# Patient Record
Sex: FEMALE | Race: BLACK OR AFRICAN AMERICAN | NOT HISPANIC OR LATINO
[De-identification: names, ages, dates, MRNs, and addresses within clinical notes are randomized per-mention and may not be internally consistent; named-entity substitution may affect disease eponyms.]

---

## 2019-04-28 ENCOUNTER — FORM ENCOUNTER (OUTPATIENT)
Age: 59
End: 2019-04-28

## 2019-10-24 PROBLEM — Z00.00 ENCOUNTER FOR PREVENTIVE HEALTH EXAMINATION: Status: ACTIVE | Noted: 2019-10-24

## 2019-10-30 ENCOUNTER — APPOINTMENT (OUTPATIENT)
Dept: ENDOCRINOLOGY | Facility: CLINIC | Age: 59
End: 2019-10-30
Payer: COMMERCIAL

## 2019-10-30 VITALS
BODY MASS INDEX: 30.35 KG/M2 | SYSTOLIC BLOOD PRESSURE: 132 MMHG | HEART RATE: 94 BPM | HEIGHT: 70 IN | DIASTOLIC BLOOD PRESSURE: 75 MMHG | WEIGHT: 212 LBS

## 2019-10-30 DIAGNOSIS — Z83.3 FAMILY HISTORY OF DIABETES MELLITUS: ICD-10-CM

## 2019-10-30 DIAGNOSIS — Z82.49 FAMILY HISTORY OF ISCHEMIC HEART DISEASE AND OTHER DISEASES OF THE CIRCULATORY SYSTEM: ICD-10-CM

## 2019-10-30 PROCEDURE — 99205 OFFICE O/P NEW HI 60 MIN: CPT

## 2019-11-01 LAB
25(OH)D3 SERPL-MCNC: 18.5 NG/ML
ALBUMIN SERPL ELPH-MCNC: 4.5 G/DL
ALP BLD-CCNC: 94 U/L
ALT SERPL-CCNC: 34 U/L
ANION GAP SERPL CALC-SCNC: 11 MMOL/L
AST SERPL-CCNC: 32 U/L
BILIRUB SERPL-MCNC: 0.4 MG/DL
BUN SERPL-MCNC: 11 MG/DL
CALCIUM SERPL-MCNC: 11.5 MG/DL
CALCIUM SERPL-MCNC: 11.5 MG/DL
CHLORIDE SERPL-SCNC: 102 MMOL/L
CO2 SERPL-SCNC: 24 MMOL/L
CREAT SERPL-MCNC: 0.58 MG/DL
GLUCOSE SERPL-MCNC: 142 MG/DL
MAGNESIUM SERPL-MCNC: 1.8 MG/DL
PARATHYROID HORMONE INTACT: 105 PG/ML
PHOSPHATE SERPL-MCNC: 2.8 MG/DL
POTASSIUM SERPL-SCNC: 4.3 MMOL/L
PROT SERPL-MCNC: 7.8 G/DL
SODIUM SERPL-SCNC: 136 MMOL/L

## 2019-11-01 RX ORDER — ERGOCALCIFEROL 1.25 MG/1
1.25 MG CAPSULE ORAL
Refills: 0 | Status: DISCONTINUED | COMMUNITY
End: 2019-11-01

## 2019-11-12 ENCOUNTER — FORM ENCOUNTER (OUTPATIENT)
Age: 59
End: 2019-11-12

## 2019-11-13 ENCOUNTER — APPOINTMENT (OUTPATIENT)
Dept: RADIOLOGY | Facility: CLINIC | Age: 59
End: 2019-11-13
Payer: COMMERCIAL

## 2019-11-13 ENCOUNTER — OUTPATIENT (OUTPATIENT)
Dept: OUTPATIENT SERVICES | Facility: HOSPITAL | Age: 59
LOS: 1 days | End: 2019-11-13

## 2019-11-13 ENCOUNTER — APPOINTMENT (OUTPATIENT)
Dept: ULTRASOUND IMAGING | Facility: CLINIC | Age: 59
End: 2019-11-13
Payer: COMMERCIAL

## 2019-11-13 PROCEDURE — 76770 US EXAM ABDO BACK WALL COMP: CPT | Mod: 26

## 2019-11-13 PROCEDURE — 77085 DXA BONE DENSITY AXL VRT FX: CPT | Mod: 26

## 2019-11-13 PROCEDURE — 77081 DXA BONE DENSITY APPENDICULR: CPT | Mod: 26,59

## 2019-11-14 ENCOUNTER — TRANSCRIPTION ENCOUNTER (OUTPATIENT)
Age: 59
End: 2019-11-14

## 2019-11-14 NOTE — ASSESSMENT
[FreeTextEntry1] : Primary hyperparathyroidism. She was first noted to have mild hypercalcemia in 2016. She has hypercalcemia with elevated PTH concentrations consistent with primary hyperparathyroidism. We discussed the complications of primary hyperparathyroidism, including but not limited to hypercalcemia, nephrolithiasis, and osteoporosis. We discussed renal ultrasound and bone density testing for further evaluation. We discussed the recommendations for calcium and vitamin D intake; there is no indication to restrict calcium intake in patients with primary hyperparathyroidism. We can continue to monitor if she has mild, asymptomatic disease. \par Check calciotropic panel\par Renal ultrasound to evaluate for nephrolithiasis\par Bone density testing with vertebral fracture assessment to evaluate for osteoporosis and morphometric vertebral fracture\par Calcium 1200 mg daily from diet and supplements (to be taken in divided doses as no more than 500-600 mg can be absorbed at one time); advised dietary calcium\par Adjust vitamin D to 2000 intl units daily pending 25-hydroxyvitamin D level \par \par I reviewed the laboratories performed from 2016 to present with the patient today. \par I counseled the patient regarding calcium and vitamin D intake today.\par \par CC:\par Dr. Luisana Feliz, Fax [phone]

## 2019-11-14 NOTE — HISTORY OF PRESENT ILLNESS
[FreeTextEntry1] : Ms. Navarro is a 59 year-old woman with a history of type 2 diabetes mellitus (HbA1c 6.3% in September 2019), hyperlipidemia presenting to establish care with me for primary hyperparathyroidism.\par \par Primary hyperparathyroidism. \par She was first noted to have hypercalcemia in August 2016 with serum calcium 10.7 mg/dL (normal: 8.6-10.4; albumin 4.1 g/dL). Laboratory tests from September 2019 significant for serum calcium 10.9 mg/dL (normal: 8.6-10.4) with PTH 57 pg/mL (normal: 14-64). Renal function within range.\par No history of nephrolithiasis or fracture. No renal imaging or bone density testing.\par She has rare dietary calcium intake. No calcium supplements. She was started on ergocalciferol 50,000 intl units weekly in 2016.\par Mother and son with history of diabetes. No family history of calcium or other endocrine disorders. \par \par She has hot flashes, overall improving from previous. No chest pain, shortness of breath, polyuria/polydipsia, lower extremity numbness/tingling.

## 2019-11-14 NOTE — DATA REVIEWED
[FreeTextEntry1] : Laboratories (September 21, 2019) reviewed and significant for:\par Calcium 10.9 (albumin 4.2 g/dL)\par Alkaline phosphatase 89 U/L (normal: )\par BUN/creatinine 8/0.59 mg/dL (eGFR 117 mL/min)\par Phosphorus 2.7 mg/dL (normal: 2.5-4.5)\par TSH 1.21 uIU/mL (normal: 0.4-4.50)\par HbA1c 6.3%\par \par Otherwise summarized as per HPI.

## 2019-11-14 NOTE — PHYSICAL EXAM
[Alert] : alert [No Acute Distress] : no acute distress [Healthy Appearance] : healthy appearance [Normal Sclera/Conjunctiva] : normal sclera/conjunctiva [Normal Oropharynx] : the oropharynx was normal [No Neck Mass] : no neck mass was observed [Supple] : the neck was supple [No LAD] : no lymphadenopathy [Thyroid Not Enlarged] : the thyroid was not enlarged [No Thyroid Nodules] : there were no palpable thyroid nodules [Normal Rate and Effort] : normal respiratory rhythm and effort [Clear to Auscultation] : lungs were clear to auscultation bilaterally [Normal Rate] : heart rate was normal  [Normal S1, S2] : normal S1 and S2 [Regular Rhythm] : with a regular rhythm [No Stigmata of Cushings Syndrome] : no stigmata of cushings syndrome [Normal Gait] : normal gait [Acanthosis Nigricans] : acanthosis nigricans [Normal Insight/Judgement] : insight and judgment were intact [Kyphosis] : no kyphosis present [de-identified] : no moon facies, no supraclavicular fat pads

## 2019-11-14 NOTE — ADDENDUM
[FreeTextEntry1] : Recent test results as below; discussed with Ms. Navarro. Serum calcium 11.5 mg/dL with  pg/mL, consistent with primary hyperparathyroidism. I encouraged hydration to help lower serum calcium. Vitamin D remains low on ergocalciferol 50,000 intl units weekly and recommend adjusting to cholecalciferol 50,000 intl units weekly. She is scheduled for renal ultrasound and bone density and will call me afterwards to discuss results. Even if imaging is within range, I want to see her back in 3-4 months to trend serum calcium, since she may meet surgical criteria if it remains above 1 mg/dL above the upper normal limit. 11/01/19\par \par Recent ultrasound without evidence of nephrolithiasis. Recent bone density significant for T-scores of -1.9 at the lumbar spine, -1.0 at the femoral neck, -0.8 at the total hip, -1.6 at the 1/3 radius. Vertebral fracture assessment without evidence of compression fractures. Her 10 year fracture risk calculated by F[facility] is 2.8% for major osteoporotic fracture and 0.1% for hip fracture, below the treatment thresholds. I discussed these reassuring results with Ms. Navarro. She will make an appointment to see me in 3 months for monitoring of serum calcium. 11/14/19

## 2020-04-30 ENCOUNTER — FORM ENCOUNTER (OUTPATIENT)
Age: 60
End: 2020-04-30

## 2020-05-07 ENCOUNTER — FORM ENCOUNTER (OUTPATIENT)
Age: 60
End: 2020-05-07

## 2021-05-27 ENCOUNTER — APPOINTMENT (OUTPATIENT)
Dept: ENDOCRINOLOGY | Facility: CLINIC | Age: 61
End: 2021-05-27
Payer: COMMERCIAL

## 2021-05-27 VITALS
WEIGHT: 217 LBS | SYSTOLIC BLOOD PRESSURE: 147 MMHG | BODY MASS INDEX: 31.07 KG/M2 | HEART RATE: 89 BPM | DIASTOLIC BLOOD PRESSURE: 87 MMHG | HEIGHT: 70 IN

## 2021-05-27 LAB
GLUCOSE BLDC GLUCOMTR-MCNC: 221
HBA1C MFR BLD HPLC: 7.2

## 2021-05-27 PROCEDURE — 99214 OFFICE O/P EST MOD 30 MIN: CPT | Mod: 25

## 2021-05-27 PROCEDURE — 83036 HEMOGLOBIN GLYCOSYLATED A1C: CPT | Mod: QW

## 2021-05-27 PROCEDURE — 82962 GLUCOSE BLOOD TEST: CPT

## 2021-05-27 RX ORDER — CALCIUM CITRATE/VITAMIN D3 315MG-6.25
TABLET ORAL
Refills: 0 | Status: ACTIVE | COMMUNITY

## 2021-05-27 RX ORDER — VIT C/E/ZN/COPPR/LUTEIN/ZEAXAN 250MG-90MG
CAPSULE ORAL
Refills: 0 | Status: ACTIVE | COMMUNITY

## 2021-06-07 ENCOUNTER — OUTPATIENT (OUTPATIENT)
Dept: OUTPATIENT SERVICES | Facility: HOSPITAL | Age: 61
LOS: 1 days | End: 2021-06-07

## 2021-06-07 ENCOUNTER — RESULT REVIEW (OUTPATIENT)
Age: 61
End: 2021-06-07

## 2021-06-07 ENCOUNTER — APPOINTMENT (OUTPATIENT)
Dept: RADIOLOGY | Facility: CLINIC | Age: 61
End: 2021-06-07
Payer: COMMERCIAL

## 2021-06-07 PROCEDURE — 77085 DXA BONE DENSITY AXL VRT FX: CPT | Mod: 26

## 2021-11-23 ENCOUNTER — APPOINTMENT (OUTPATIENT)
Dept: ENDOCRINOLOGY | Facility: CLINIC | Age: 61
End: 2021-11-23

## 2021-12-13 ENCOUNTER — APPOINTMENT (OUTPATIENT)
Dept: ENDOCRINOLOGY | Facility: CLINIC | Age: 61
End: 2021-12-13
Payer: COMMERCIAL

## 2021-12-13 VITALS
SYSTOLIC BLOOD PRESSURE: 140 MMHG | DIASTOLIC BLOOD PRESSURE: 85 MMHG | WEIGHT: 214 LBS | HEIGHT: 70 IN | BODY MASS INDEX: 30.64 KG/M2 | HEART RATE: 97 BPM

## 2021-12-13 LAB
GLUCOSE BLDC GLUCOMTR-MCNC: 100
HBA1C MFR BLD HPLC: 7.5

## 2021-12-13 PROCEDURE — 83036 HEMOGLOBIN GLYCOSYLATED A1C: CPT | Mod: QW

## 2021-12-13 PROCEDURE — 82962 GLUCOSE BLOOD TEST: CPT

## 2021-12-13 PROCEDURE — 99213 OFFICE O/P EST LOW 20 MIN: CPT | Mod: 25

## 2021-12-13 NOTE — REASON FOR VISIT
[Follow - Up] : a follow-up visit [Hypercalcemia] : hypercalcemia [DM Type 2] : DM Type 2 [Hypoparathyroidism] : hypoparathyroidism

## 2021-12-16 DIAGNOSIS — R74.8 ABNORMAL LEVELS OF OTHER SERUM ENZYMES: ICD-10-CM

## 2021-12-20 LAB
25(OH)D3 SERPL-MCNC: 16.2 NG/ML
ALBUMIN SERPL ELPH-MCNC: 4.7 G/DL
ALP BLD-CCNC: 129 U/L
ALT SERPL-CCNC: 41 U/L
ANION GAP SERPL CALC-SCNC: 10 MMOL/L
AST SERPL-CCNC: 55 U/L
BASOPHILS # BLD AUTO: 0.02 K/UL
BASOPHILS NFR BLD AUTO: 0.3 %
BILIRUB SERPL-MCNC: 0.5 MG/DL
BUN SERPL-MCNC: 10 MG/DL
CALCIUM SERPL-MCNC: 11.5 MG/DL
CALCIUM SERPL-MCNC: 11.5 MG/DL
CHLORIDE SERPL-SCNC: 103 MMOL/L
CHOLEST SERPL-MCNC: 214 MG/DL
CO2 SERPL-SCNC: 26 MMOL/L
CREAT SERPL-MCNC: 0.83 MG/DL
CREAT SPEC-SCNC: 311 MG/DL
EOSINOPHIL # BLD AUTO: 0.09 K/UL
EOSINOPHIL NFR BLD AUTO: 1.5 %
ESTIMATED AVERAGE GLUCOSE: 166 MG/DL
GGT SERPL-CCNC: 44 U/L
GLUCOSE SERPL-MCNC: 117 MG/DL
HBA1C MFR BLD HPLC: 7.4 %
HCT VFR BLD CALC: 39.2 %
HDLC SERPL-MCNC: 89 MG/DL
HGB BLD-MCNC: 12.5 G/DL
IMM GRANULOCYTES NFR BLD AUTO: 0.2 %
LDLC SERPL CALC-MCNC: 95 MG/DL
LYMPHOCYTES # BLD AUTO: 2.27 K/UL
LYMPHOCYTES NFR BLD AUTO: 38.9 %
MAGNESIUM SERPL-MCNC: 1.8 MG/DL
MAN DIFF?: NORMAL
MCHC RBC-ENTMCNC: 28.8 PG
MCHC RBC-ENTMCNC: 31.9 GM/DL
MCV RBC AUTO: 90.3 FL
MICROALBUMIN 24H UR DL<=1MG/L-MCNC: 7 MG/DL
MICROALBUMIN/CREAT 24H UR-RTO: 22 MG/G
MONOCYTES # BLD AUTO: 0.54 K/UL
MONOCYTES NFR BLD AUTO: 9.2 %
NEUTROPHILS # BLD AUTO: 2.91 K/UL
NEUTROPHILS NFR BLD AUTO: 49.9 %
NONHDLC SERPL-MCNC: 125 MG/DL
PARATHYROID HORMONE INTACT: 146 PG/ML
PHOSPHATE SERPL-MCNC: 3 MG/DL
PLATELET # BLD AUTO: 218 K/UL
POTASSIUM SERPL-SCNC: 4.3 MMOL/L
PROT SERPL-MCNC: 8.3 G/DL
RBC # BLD: 4.34 M/UL
RBC # FLD: 13 %
SODIUM SERPL-SCNC: 138 MMOL/L
T3 SERPL-MCNC: 127 NG/DL
T4 FREE SERPL-MCNC: 1.3 NG/DL
TRIGL SERPL-MCNC: 150 MG/DL
TSH SERPL-ACNC: 1.96 UIU/ML
WBC # FLD AUTO: 5.84 K/UL

## 2021-12-20 NOTE — ASSESSMENT
[Diabetes Foot Care] : diabetes foot care [Carbohydrate Consistent Diet] : carbohydrate consistent diet [Long Term Vascular Complications] : long term vascular complications of diabetes [Importance of Diet and Exercise] : importance of diet and exercise to improve glycemic control, achieve weight loss and improve cardiovascular health [Self Monitoring of Blood Glucose] : self monitoring of blood glucose [FreeTextEntry1] : Primary hyperparathyroidism. She was first noted to have hypercalcemia in August 2016 with serum calcium 10.7 mg/dL (normal: 8.6-10.4; albumin 4.1 g/dL). Laboratory tests from September 2019 significant for serum calcium 10.9 mg/dL (normal: 8.6-10.4) with PTH 57 pg/mL (normal: 14-64). Laboratory evaluation from March 2021 significant for serum calcium 11.0 mg/dL (albumin 4.3 g/dL; normal: 8.6-10.4). 25-hydroxyvitamin D 19 ng/mL.  Evaluation at the time of her initial visit confirmed primary hyperparathyroidism. \par No history of nephrolithiasis. Renal imaging in November 2019 without evidence of nephrolithiasis. Renal function within range.\par No history of fracture. Bone density from November 2019 significant for T-scores of -1.9 at the lumbar spine, -1.0 at the femoral neck, -0.8 at the total hip, -1.6 at the 1/3 radius. Vertebral fracture assessment without evidence of compression fractures. \par  Bone density in June 2021 overall stable at the spine and hip sites from previous. Vertebral fracture assessment without evidence of compression fractures. Not listed on the report is the distal 1/3 radius site, now with a T-score of -2.6 (-9.5%* from previous in 2019). We should consider referral for parathyroid surgery. Dr Chavez and patient never connected to discuss surgery.\par Renal imaging in November 2019 without evidence of nephrolithiasis.\par Calcium 1200 mg daily from diet and supplements (to be taken in divided doses as no more than 500-600 mg can be absorbed at one time); continue current regimen\par Continue ergocalciferol to 50,000 intl units weekly\par Check calciotropic panel next visit\par Interval bone density testing.\par We discussed primary hyperparathyroidism and it's effects on bone density, which is concerning due to decreased bone density in radius on last DEXA. Discussed definitive therapy with parathyroidectomy and RAB.\par Referred to ENT for parathyroidectomy evaluation.\par \par Type 2 diabetes mellitus. Point-of-care HbA1c 7.5%. No known history of micro- or macrovascular complications. We discussed the cardiovascular and microvascular complications of uncontrolled diabetes. We discussed the importance of diet and exercise and lifestyle modification for glycemic control. We will adjust her regimen as below.\par Continue metformin to 1000 mg twice daily. Start Januvia 100mg daily.\par She is not on a blood pressure regimen; blood pressure around goal\par She is on a statin for cholesterol; last lipid panel around goal\par Nephropathy screening: Urine microalbumin within range in Dec 2021\par Last ophthalmology appointment: Oct 2021; every 6 months\par Last podiatry appointment: every three months\par Last dental appointment: December 2020\par She is not up-to-date with pneumonia vaccine; she had had both Covid vaccines, and schedule for booster in Feb.\par \par Return to see Dr Chavez in 6 months.. Patient advised to call earlier with significant hypo- or hyperglycemia.

## 2021-12-20 NOTE — ADDENDUM
[FreeTextEntry1] : 12/20/2021 AL\par Discussed lab results with patient.\par PTH, Ca elevated as expected. Vit D is low. She is taking Vitamin D 50K weekly. Will increase to twice weekly for 8 weeks. She has appt. with ENT.\par AST, ALP, and GGT slightly elevated. Denies abd pain/N/V. Will continue to monitor. Consider abd US if repeat labs remain elevated.

## 2021-12-20 NOTE — HISTORY OF PRESENT ILLNESS
[FreeTextEntry1] : Ms. Navarro is a 61 year-old woman with a history of type 2 diabetes mellitus, hyperlipidemia, primary hyperparathyroidism presenting for follow-up of her endocrine issues. I saw her for an initial visit in October 2019.\par \par Primary hyperparathyroidism. \par She was first noted to have hypercalcemia in August 2016 with serum calcium 10.7 mg/dL (normal: 8.6-10.4; albumin 4.1 g/dL). Laboratory tests from September 2019 significant for serum calcium 10.9 mg/dL (normal: 8.6-10.4) with PTH 57 pg/mL (normal: 14-64). Laboratory evaluation from March 2021 significant for serum calcium 11.0 mg/dL (albumin 4.3 g/dL; normal: 8.6-10.4). 25-hydroxyvitamin D 19 ng/mL.  Evaluation at the time of her initial visit confirmed primary hyperparathyroidism. \par No history of nephrolithiasis. Renal imaging in November 2019 without evidence of nephrolithiasis. Renal function within range.\par No history of fracture. Bone density from November 2019 significant for T-scores of -1.9 at the lumbar spine, -1.0 at the femoral neck, -0.8 at the total hip, -1.6 at the 1/3 radius. Vertebral fracture assessment without evidence of compression fractures. \par  Bone density in June 2021 overall stable at the spine and hip sites from previous. Vertebral fracture assessment without evidence of compression fractures. Not listed on the report is the distal 1/3 radius site, now with a T-score of -2.6 (-9.5%* from previous in 2019). We should consider referral for parathyroid surgery. Dr Chavez and patient never connected to discuss surgery.\par Renal imaging in November 2019 without evidence of nephrolithiasis.\par She has 2 servings of dietary calcium intake daily. She has been taking ergocalciferol 50,000 intl units every weekly.\par Mother and son with history of diabetes. No family history of calcium or other endocrine disorders. \par \par Type 2 diabetes mellitus. Point-of-care HbA1c 7.5% and blood glucose 100 mg/dL today. No known history of micro- or macrovascular complications.\par She was diagnosed with diabetes in 2017. No hospitalizations for hypo- or hyperglycemia.\par She is currently taking metformin 1000 mg twice daily; increased from 500mg twice daily in May 2021.\par She is using freestyle ilya. Brought today for placement. She has a family member who usually place for her. \par Data uploaded and analyzed. 30 day - BG range  = 61%, 141-240 = 38%, >241 = 1%. Fasting at goal, hyperglycemia usually postprandial.\par She is not on a blood pressure regimen\par She is on a statin for cholesterol\par Nephropathy screening: Urine microalbumin within range in March 2021\par Last ophthalmology appointment: March 2021\par Last podiatry appointment: March 2021; every three months\par Last dental appointment: December 2020\par She is not up-to-date with pneumonia vaccine; she has both COVID vaccines and scheduled for booster in Feb.\par \par Interim History \par She is retired and travelling a lot. No new complaints. Diet seems appropriate. Snacks on fruit including grapes.\par No chest pain, shortness of breath, polyuria/polydipsia, lower extremity numbness/tingling. \par Medical and surgical history, medications, allergies, social and family history reviewed and updated as needed. \par  \par

## 2021-12-20 NOTE — PHYSICAL EXAM
[Alert] : alert [Well Nourished] : well nourished [No Acute Distress] : no acute distress [Well Developed] : well developed [Normal Sclera/Conjunctiva] : normal sclera/conjunctiva [EOMI] : extra ocular movement intact [No Proptosis] : no proptosis [Normal Oropharynx] : the oropharynx was normal [No Thyroid Nodules] : no palpable thyroid nodules [No Respiratory Distress] : no respiratory distress [No Accessory Muscle Use] : no accessory muscle use [Clear to Auscultation] : lungs were clear to auscultation bilaterally [Normal S1, S2] : normal S1 and S2 [Normal Rate] : heart rate was normal [Regular Rhythm] : with a regular rhythm [No Edema] : no peripheral edema [Pedal Pulses Normal] : the pedal pulses are present [Normal Bowel Sounds] : normal bowel sounds [Not Tender] : non-tender [Not Distended] : not distended [Soft] : abdomen soft [Normal Anterior Cervical Nodes] : no anterior cervical lymphadenopathy [Normal Posterior Cervical Nodes] : no posterior cervical lymphadenopathy [No Spinal Tenderness] : no spinal tenderness [Spine Straight] : spine straight [No Stigmata of Cushings Syndrome] : no stigmata of Cushings Syndrome [Normal Gait] : normal gait [No Involuntary Movements] : no involuntary movements were seen [Normal Strength/Tone] : muscle strength and tone were normal [No Rash] : no rash [No Tremors] : no tremors [Oriented x3] : oriented to person, place, and time [Normal Insight/Judgement] : insight and judgment were intact [Normal Mood] : the mood was normal [Acanthosis Nigricans] : no acanthosis nigricans [de-identified] : mild thyromegaly

## 2022-01-11 ENCOUNTER — APPOINTMENT (OUTPATIENT)
Dept: OTOLARYNGOLOGY | Facility: CLINIC | Age: 62
End: 2022-01-11
Payer: COMMERCIAL

## 2022-01-11 VITALS
HEIGHT: 70 IN | SYSTOLIC BLOOD PRESSURE: 144 MMHG | BODY MASS INDEX: 30.64 KG/M2 | RESPIRATION RATE: 17 BRPM | HEART RATE: 93 BPM | OXYGEN SATURATION: 98 % | DIASTOLIC BLOOD PRESSURE: 87 MMHG | TEMPERATURE: 98 F | WEIGHT: 214 LBS

## 2022-01-11 DIAGNOSIS — Z86.39 PERSONAL HISTORY OF OTHER ENDOCRINE, NUTRITIONAL AND METABOLIC DISEASE: ICD-10-CM

## 2022-01-11 DIAGNOSIS — Z87.09 PERSONAL HISTORY OF OTHER DISEASES OF THE RESPIRATORY SYSTEM: ICD-10-CM

## 2022-01-11 DIAGNOSIS — Z82.3 FAMILY HISTORY OF STROKE: ICD-10-CM

## 2022-01-11 DIAGNOSIS — Z80.9 FAMILY HISTORY OF MALIGNANT NEOPLASM, UNSPECIFIED: ICD-10-CM

## 2022-01-11 DIAGNOSIS — Z78.9 OTHER SPECIFIED HEALTH STATUS: ICD-10-CM

## 2022-01-11 DIAGNOSIS — Z82.49 FAMILY HISTORY OF ISCHEMIC HEART DISEASE AND OTHER DISEASES OF THE CIRCULATORY SYSTEM: ICD-10-CM

## 2022-01-11 PROCEDURE — 99205 OFFICE O/P NEW HI 60 MIN: CPT | Mod: 25

## 2022-01-11 PROCEDURE — 76536 US EXAM OF HEAD AND NECK: CPT

## 2022-01-11 PROCEDURE — 31575 DIAGNOSTIC LARYNGOSCOPY: CPT

## 2022-01-11 NOTE — HISTORY OF PRESENT ILLNESS
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  She has osteopenia in the hip and spine. She has no renal stones on ultrasound and renal function is normal. Her last Ca/PTH 11.5 mg/dl/ 146 pg/ml are consistent with PHPT and she meets current NIH consensus criteria for definitive treatment. Ladi denies recent shortness of breath, voice changes, dysphagia, anterior neck pain, neck pressure or mass. There is no family history of thyroid cancer. She denies any known radiation exposures in her youth.  She denies calcium, vitamin D supplements, HCTZ or past use of Lithium Carbonate. A first cousin had renal stones but other history of renal disease not known.  There is no history of fragility bone fractures. Other than muscle weakness, joint pain, polyuria/ polydipsia, she denies depression, memory loss, brain fog, nausea, vomiting, abdominal pain, constipation, nephrolithiasis, peptic ulcer disease, pancreatitis or GERD. She denies fever, body aches, cough, cyanosis, chest burning, anosmia or recent known COVID exposures.  All family members at home are well. She has been vaccinated.  She has not had parathyroid neck imaging to date.

## 2022-01-11 NOTE — PROCEDURE
[FreeTextEntry3] : \par [facility]\par \par THYROID/NECK ULTRASOUND REPORT\par \par NAME: FELISA KWONG .Jeannie...           MR# [id number]...... : 1960..   DATE: 2022.\par \par HISTORY/ INDICATIONS: A 61-year-old female with biochemical evidence for primary hyperparathyroidism, osteoporosis, hypercalcemia to rule out a parathyroid adenoma and/or thyroid nodular disease.  \par \par COMPARISON: None.\par \par PROCEDURE: Physician performed high-resolution ultrasound gray scale imaging and color Doppler supplementation of the thyroid gland and neck was obtained in the longitudinal and transverse planes using a 13 MHz linear transducer with image capture.  All measurements are in centimeters (longitudinal x AP x transverse).  \par \par FINDINGS: Overall the thyroid gland is minimally enlarged, right lobe, heterogeneous in echotexture with normal vascularity on color Doppler flow. \par \par RIGHT LOBE: Is minimally enlarged, heterogeneous, with normal vascularity on color Doppler and measures 5.07 x 0.99 x 1.53 cm.  NODULES: There is a possible right lower pole hypoechoic smoothly marginated nodule measuring 0.62 x 0.26 x 0.55 cm representing either a colloid cyst or a normal parathyroid gland.\par \par ISTHMUS: Measures 0.29 cm in AP dimension and is heterogeneous in echotexture with normal vascularity.  No nodules are identified.\par \par LEFT LOBE: Is not enlarged, heterogeneous, with normal vascularity on color Doppler and measures 3.87 x 1.14 x 1.31 cm.  NODULES: None identified.\par \par PARATHYROID GLANDS: Posterior to the left mid thyroid lobe is a mildly hypoechoic, smoothly marginated structure  by an echogenic line and a superior polar feeding vessel that measures 0.81 x 0.34 x 0.76 cm and a possible candidate for a parathyroid adenoma.  There are no other identified enlarged parathyroid glands in the central neck compartment. \par \par LYMPH NODES: Bilateral neck levels I - VI were examined.  There are several benign appearing subcentimeter lymph nodes identified at neck levels II- III bilaterally (lateral neck), all with echogenic hilar lines, no calcifications or cystic degeneration and have a short long axis ratio < 0.5 in the transverse plane.  There are prominent bilateral level II lymph nodes that have a central echogenic hilar line and normal shape.  There are no enlarged or abnormal appearing central compartment, level VI lymph nodes.\par \par IMPRESSION: A 61-year-old female with a borderline enlarged thyroid gland without significant nodules and a good candidate for a left low-lying superior parathyroid adenoma in the mid position posterior to the left thyroid lobe.  A normal parathyroid gland versus a subcentimeter colloid cyst is identified in the area of the right lower thyroid pole.\par \par RECOMMENDATIONS: A 4–D CT scan should be obtained for confirmation of these findings prior to parathyroidectomy.\par \par Electronically signed by referring, interpreting and reporting physician Jose Seaman MD on 2022, 3:45 PM.\par \par [facility]: [address], [address] [place] [address]\par [phone] (voice), [phone] (fax) [de-identified] : The nasal septum is minimally deviated to the right. There are no masses or polyps and the nasal mucosa and secretions are normal. The choanae and posterior nasopharynx are normal without masses or drainage. The Eustachian tube orifices appear patent. The pharynx, including the posterior and lateral pharyngeal walls, the vallecula and base of tongue are normal without ulcerations, lesions or masses. The hypopharynx including the pyriform sinuses open well without pooling of secretions, mucosal lesions or masses. The supraglottic larynx including the epiglottis, petiole, arytenoids, glossoepiglottic, aryepiglottic and pharyngoepiglottic folds are normal without mucosal lesions, ulcerations or masses. The glottis reveals normal false vocal folds. The true vocal folds are glistening white, tense and of equal length, without paralysis, having symmetric mobility on adduction and abduction. There are no mucosal lesions, nodules, cysts, erythroplasia or leukoplakia. The posterior cricoid area has healthy pink mucosa in the interarytenoid area and esophageal inlet. There is moderate thickening/edema of the interarytenoid mucosa suggestive of posterior laryngitis from laryngopharyngeal acid reflux disease. The trachea is clear without narrowing in the immediate subglottic region, without deviation or lesions.  [de-identified] : preoperative evaluation prior to possible parathyroidectomy.

## 2022-01-11 NOTE — REASON FOR VISIT
[FreeTextEntry2] : a surgical consultation concerning primary hyperparathyroidism, osteoporosis and hypercalcemia.  [FreeTextEntry1] : Endocrinologists Ashlyn Chavez MD and Lisa Nelson MD

## 2022-01-11 NOTE — CONSULT LETTER
[FreeTextEntry3] : \par Jose Seaman M.D., FACS, [facility]\par Director [facility]\par [facility]\par Certified in Thyroid/Parathyroid/Neck Ultrasound, [facility]/ [facility]\par \par , Department of Otolaryngology\par [facility]\par

## 2022-01-12 NOTE — PHYSICAL EXAM
[Alert] : alert [Healthy Appearance] : healthy appearance [No Acute Distress] : no acute distress [Normal Sclera/Conjunctiva] : normal sclera/conjunctiva [Normal Hearing] : hearing was normal [No Neck Mass] : no neck mass was observed [No LAD] : no lymphadenopathy [Supple] : the neck was supple [Thyroid Not Enlarged] : the thyroid was not enlarged [No Respiratory Distress] : no respiratory distress [No Stigmata of Cushings Syndrome] : no stigmata of Cushings Syndrome [Normal Gait] : normal gait [Normal Insight/Judgement] : insight and judgment were intact [Kyphosis] : no kyphosis present [Acanthosis Nigricans] : no acanthosis nigricans [de-identified] : no moon facies, no supraclavicular fat pads

## 2022-01-12 NOTE — HISTORY OF PRESENT ILLNESS
[FreeTextEntry1] : Ms. Navarro is a 60 year-old woman with a history of type 2 diabetes mellitus, hyperlipidemia, primary hyperparathyroidism presenting for follow-up of her endocrine issues. I saw her for an initial visit in October 2019.\par \par Primary hyperparathyroidism. \par She was first noted to have hypercalcemia in August 2016 with serum calcium 10.7 mg/dL (normal: 8.6-10.4; albumin 4.1 g/dL). Laboratory tests from September 2019 significant for serum calcium 10.9 mg/dL (normal: 8.6-10.4) with PTH 57 pg/mL (normal: 14-64). Renal function within range. Evaluation at the time of her initial visit confirmed primary hyperparathyroidism. \par No history of nephrolithiasis. Renal imaging in November 2019 without evidence of nephrolithiasis.\par No history of fracture. Bone density from November 2019 significant for T-scores of -1.9 at the lumbar spine, -1.0 at the femoral neck, -0.8 at the total hip, -1.6 at the 1/3 radius. Vertebral fracture assessment without evidence of compression fractures. \par She has rare dietary calcium intake. She is taking Caltrate 600 mg daily. She has been taking ergocalciferol 50,000 intl units every two weeks. \par Mother and son with history of diabetes. No family history of calcium or other endocrine disorders. \par \par Type 2 diabetes mellitus. Point-of-care HbA1c 7.2% and blood glucose 211 mg/dL today. No known history of micro- or macrovascular complications.\par She was diagnosed with diabetes in 2017. No hospitalizations for hypo- or hyperglycemia.\par She is currently taking metformin 500 mg twice daily\par She is not on a blood pressure regimen\par She is on a statin for cholesterol\par Nephropathy screening: Urine microalbumin within range in March 2021\par Last ophthalmology appointment: March 2021\par Last podiatry appointment: March 2021; every three months\par Last dental appointment: December 2020\par She is not up-to-date with pneumonia vaccine; she is in the process of scheduling the COVID-19 vaccine\par \par Interim History \par Renal imaging in November 2019 without evidence of nephrolithiasis. Bone density from November 2019 significant for T-scores of -1.9 at the lumbar spine, -1.0 at the femoral neck, -0.8 at the total hip, -1.6 at the 1/3 radius. Vertebral fracture assessment without evidence of compression fractures. \par Laboratory evaluation from March 2021 significant for serum calcium 11.0 mg/dL (albumin 4.3 g/dL; normal: 8.6-10.4). 25-hydroxyvitamin D 19 ng/mL. \par Her mother passed away from COVID-19 infection last year after a prolonged hospital course.\par No chest pain, shortness of breath, polyuria/polydipsia, lower extremity numbness/tingling. \par Medical and surgical history, medications, allergies, social and family history reviewed and updated as needed.

## 2022-01-12 NOTE — DATA REVIEWED
[FreeTextEntry1] : Laboratories (March 23, 2021) reviewed and significant for:\par Unremarkable complete blood count\par Calcium 11.0 (albumin 4.3 g/dL; normal: 8.6-10.4)\par BUN/creatinine 8/0.59 mg/dL (eGFR 116 mL/min)\par Alkaline phosphatase 95 U/L (normal: )\par TSH 1.04 uIU/mL\par LDL 86 mg/dL\par HDL 79 mg/dL\par Total cholesterol 181 mg/dL\par Triglycerides 72 mg/dL\par Urine microalbumin 17 mcg/mg creatinine\par \par Otherwise summarized as per HPI.

## 2022-01-12 NOTE — ASSESSMENT
[FreeTextEntry1] : Primary hyperparathyroidism. She was first noted to have mild hypercalcemia in 2016. She has hypercalcemia with elevated PTH concentrations consistent with primary hyperparathyroidism. We discussed the complications of primary hyperparathyroidism, including but not limited to hypercalcemia, nephrolithiasis, and osteoporosis. She currently has mild, asymptomatic disease without indication for parathyroid surgery at this time. We discussed the recommendations for calcium and vitamin D intake; there is no indication to restrict calcium intake in patients with primary hyperparathyroidism. \par Check calciotropic panel next visit\par Interval bone density testing\par Calcium 1200 mg daily from diet and supplements (to be taken in divided doses as no more than 500-600 mg can be absorbed at one time); continue current regimen\par Adjust ergocalciferol to 50,000 intl units weekly\par \par Type 2 diabetes mellitus. Point-of-care HbA1c 7.2% and blood glucose 211 mg/dL today. No known history of micro- or macrovascular complications. We discussed the cardiovascular and microvascular complications of uncontrolled diabetes. We discussed the importance of diet and exercise and lifestyle modification for glycemic control. We will adjust her regimen as below.\par Adjust metformin to 1000 mg twice daily\par She is not on a blood pressure regimen; blood pressure around goal\par She is on a statin for cholesterol; last lipid panel around goal\par Nephropathy screening: Urine microalbumin within range in March 2021\par Last ophthalmology appointment: March 2021\par Last podiatry appointment: March 2021; every three months\par Last dental appointment: December 2020\par She is not up-to-date with pneumonia vaccine; she is in the process of scheduling the COVID-19 vaccine\par \par Return to see me in 3 months. Patient advised to call earlier with significant hypo- or hyperglycemia. \par \par I reviewed the bone density testing performed on November 13, 2019 with the patient today.\par I reviewed the renal ultrasound performed on November 13, 2019 with the patient today.\par I reviewed the laboratories performed from 2016 to present with the patient today. \par I counseled the patient regarding calcium and vitamin D intake today.\par \par CC:\par Dr. Luisana Feliz, Fax [phone]

## 2022-02-14 ENCOUNTER — APPOINTMENT (OUTPATIENT)
Dept: OTOLARYNGOLOGY | Facility: CLINIC | Age: 62
End: 2022-02-14
Payer: COMMERCIAL

## 2022-02-14 DIAGNOSIS — Z01.818 ENCOUNTER FOR OTHER PREPROCEDURAL EXAMINATION: ICD-10-CM

## 2022-02-14 PROCEDURE — ZZZZZ: CPT

## 2022-02-22 ENCOUNTER — TRANSCRIPTION ENCOUNTER (OUTPATIENT)
Age: 62
End: 2022-02-22

## 2022-02-22 VITALS
HEIGHT: 70 IN | HEART RATE: 83 BPM | SYSTOLIC BLOOD PRESSURE: 133 MMHG | OXYGEN SATURATION: 98 % | RESPIRATION RATE: 16 BRPM | WEIGHT: 217.6 LBS | TEMPERATURE: 98 F | DIASTOLIC BLOOD PRESSURE: 79 MMHG

## 2022-02-22 NOTE — ASU PATIENT PROFILE, ADULT - REASON FOR ADMISSION, PROFILE
parathyroidectomy, thyroid lobectomy, parathyroid autotransplant, thymectomy, needle electromyography-larynx

## 2022-02-22 NOTE — ASU PATIENT PROFILE, ADULT - FALL HARM RISK - UNIVERSAL INTERVENTIONS
Bed in lowest position, wheels locked, appropriate side rails in place/Call bell, personal items and telephone in reach/Instruct patient to call for assistance before getting out of bed or chair/Non-slip footwear when patient is out of bed/[place] to call system/Physically safe environment - no spills, clutter or unnecessary equipment/Purposeful Proactive Rounding/Room/bathroom lighting operational, light cord in reach

## 2022-02-22 NOTE — ASU PATIENT PROFILE, ADULT - NSICDXPASTMEDICALHX_GEN_ALL_CORE_FT
PAST MEDICAL HISTORY:  DMII (diabetes mellitus, type 2)     Glaucoma     Hammer toes, bilateral     History of onychomycosis     HLD (hyperlipidemia)     PAD (peripheral artery disease)     Vitamin D deficiency      PAST MEDICAL HISTORY:  DMII (diabetes mellitus, type 2)     Glaucoma     Hammer toes, bilateral     History of onychomycosis     HLD (hyperlipidemia)     PAD (peripheral artery disease)     Seasonal allergies     Vitamin D deficiency

## 2022-02-22 NOTE — ASU PATIENT PROFILE, ADULT - NSICDXPASTSURGICALHX_GEN_ALL_CORE_FT
PAST SURGICAL HISTORY:  H/O breast biopsy right    H/O colonoscopy      PAST SURGICAL HISTORY:  H/O breast biopsy right    H/O  section x1    H/O colonoscopy     History of hernia surgery avi

## 2022-02-23 ENCOUNTER — APPOINTMENT (OUTPATIENT)
Dept: OTOLARYNGOLOGY | Facility: HOSPITAL | Age: 62
End: 2022-02-23

## 2022-02-23 ENCOUNTER — OUTPATIENT (OUTPATIENT)
Dept: OUTPATIENT SERVICES | Facility: HOSPITAL | Age: 62
LOS: 1 days | Discharge: HOME | End: 2022-02-23
Payer: COMMERCIAL

## 2022-02-23 ENCOUNTER — RESULT REVIEW (OUTPATIENT)
Age: 62
End: 2022-02-23

## 2022-02-23 VITALS
RESPIRATION RATE: 24 BRPM | OXYGEN SATURATION: 100 % | SYSTOLIC BLOOD PRESSURE: 140 MMHG | DIASTOLIC BLOOD PRESSURE: 80 MMHG | HEART RATE: 80 BPM

## 2022-02-23 DIAGNOSIS — Z98.890 OTHER SPECIFIED POSTPROCEDURAL STATES: Chronic | ICD-10-CM

## 2022-02-23 DIAGNOSIS — Z98.891 HISTORY OF UTERINE SCAR FROM PREVIOUS SURGERY: Chronic | ICD-10-CM

## 2022-02-23 LAB
ALBUMIN SERPL ELPH-MCNC: 4.3 G/DL — SIGNIFICANT CHANGE UP (ref 3.3–5)
ALP SERPL-CCNC: 91 U/L — SIGNIFICANT CHANGE UP (ref 40–120)
ALT FLD-CCNC: 30 U/L — SIGNIFICANT CHANGE UP (ref 10–45)
ANION GAP SERPL CALC-SCNC: 12 MMOL/L — SIGNIFICANT CHANGE UP (ref 5–17)
AST SERPL-CCNC: 32 U/L — SIGNIFICANT CHANGE UP (ref 10–40)
BILIRUB SERPL-MCNC: 0.3 MG/DL — SIGNIFICANT CHANGE UP (ref 0.2–1.2)
BUN SERPL-MCNC: 8 MG/DL — SIGNIFICANT CHANGE UP (ref 7–23)
CALCIUM SERPL-MCNC: 10.8 MG/DL — HIGH (ref 8.4–10.5)
CHLORIDE SERPL-SCNC: 107 MMOL/L — SIGNIFICANT CHANGE UP (ref 96–108)
CO2 SERPL-SCNC: 21 MMOL/L — LOW (ref 22–31)
CREAT SERPL-MCNC: 0.63 MG/DL — SIGNIFICANT CHANGE UP (ref 0.5–1.3)
GLUCOSE BLDC GLUCOMTR-MCNC: 195 MG/DL — HIGH (ref 70–99)
GLUCOSE BLDC GLUCOMTR-MCNC: 198 MG/DL — HIGH (ref 70–99)
GLUCOSE SERPL-MCNC: 166 MG/DL — HIGH (ref 70–99)
MAGNESIUM SERPL-MCNC: 1.7 MG/DL — SIGNIFICANT CHANGE UP (ref 1.6–2.6)
PHOSPHATE SERPL-MCNC: 2.8 MG/DL — SIGNIFICANT CHANGE UP (ref 2.5–4.5)
POTASSIUM SERPL-MCNC: 4 MMOL/L — SIGNIFICANT CHANGE UP (ref 3.5–5.3)
POTASSIUM SERPL-SCNC: 4 MMOL/L — SIGNIFICANT CHANGE UP (ref 3.5–5.3)
PROT SERPL-MCNC: 8.1 G/DL — SIGNIFICANT CHANGE UP (ref 6–8.3)
PTH-INTACT IO % DIF SERPL: 11.8 PG/ML — LOW (ref 15–65)
PTH-INTACT IO % DIF SERPL: 138 PG/ML — HIGH (ref 15–65)
PTH-INTACT IO % DIF SERPL: 15 PG/ML — SIGNIFICANT CHANGE UP (ref 15–65)
PTH-INTACT IO % DIF SERPL: 24.8 PG/ML — SIGNIFICANT CHANGE UP (ref 15–65)
SARS-COV-2 N GENE NPH QL NAA+PROBE: NOT DETECTED
SODIUM SERPL-SCNC: 140 MMOL/L — SIGNIFICANT CHANGE UP (ref 135–145)

## 2022-02-23 PROCEDURE — 84100 ASSAY OF PHOSPHORUS: CPT

## 2022-02-23 PROCEDURE — 88305 TISSUE EXAM BY PATHOLOGIST: CPT | Mod: 26

## 2022-02-23 PROCEDURE — 80053 COMPREHEN METABOLIC PANEL: CPT

## 2022-02-23 PROCEDURE — 88305 TISSUE EXAM BY PATHOLOGIST: CPT

## 2022-02-23 PROCEDURE — 82962 GLUCOSE BLOOD TEST: CPT

## 2022-02-23 PROCEDURE — 88331 PATH CONSLTJ SURG 1 BLK 1SPC: CPT | Mod: 26

## 2022-02-23 PROCEDURE — 88331 PATH CONSLTJ SURG 1 BLK 1SPC: CPT

## 2022-02-23 PROCEDURE — 38510 BIOPSY/REMOVAL LYMPH NODES: CPT | Mod: RT

## 2022-02-23 PROCEDURE — 83970 ASSAY OF PARATHORMONE: CPT

## 2022-02-23 PROCEDURE — C1889: CPT

## 2022-02-23 PROCEDURE — 60500 EXPLORE PARATHYROID GLANDS: CPT

## 2022-02-23 PROCEDURE — 83735 ASSAY OF MAGNESIUM: CPT

## 2022-02-23 PROCEDURE — 38500 BIOPSY/REMOVAL LYMPH NODES: CPT

## 2022-02-23 PROCEDURE — 36415 COLL VENOUS BLD VENIPUNCTURE: CPT

## 2022-02-23 DEVICE — SURGIFLO HEMOSTATIC MATRIX KIT: Type: IMPLANTABLE DEVICE | Status: FUNCTIONAL

## 2022-02-23 RX ORDER — DEXTROSE 50 % IN WATER 50 %
25 SYRINGE (ML) INTRAVENOUS ONCE
Refills: 0 | Status: DISCONTINUED | OUTPATIENT
Start: 2022-02-23 | End: 2022-02-23

## 2022-02-23 RX ORDER — MAGNESIUM SULFATE 500 MG/ML
1 VIAL (ML) INJECTION ONCE
Refills: 0 | Status: COMPLETED | OUTPATIENT
Start: 2022-02-23 | End: 2022-02-23

## 2022-02-23 RX ORDER — DEXTROSE 50 % IN WATER 50 %
15 SYRINGE (ML) INTRAVENOUS ONCE
Refills: 0 | Status: DISCONTINUED | OUTPATIENT
Start: 2022-02-23 | End: 2022-02-23

## 2022-02-23 RX ORDER — ERGOCALCIFEROL 1.25 MG/1
1 CAPSULE ORAL
Qty: 0 | Refills: 0 | DISCHARGE

## 2022-02-23 RX ORDER — ATORVASTATIN CALCIUM 80 MG/1
1 TABLET, FILM COATED ORAL
Qty: 0 | Refills: 0 | DISCHARGE

## 2022-02-23 RX ORDER — ONDANSETRON 8 MG/1
4 TABLET, FILM COATED ORAL EVERY 6 HOURS
Refills: 0 | Status: DISCONTINUED | OUTPATIENT
Start: 2022-02-23 | End: 2022-02-23

## 2022-02-23 RX ORDER — SODIUM,POTASSIUM PHOSPHATES 278-250MG
1 POWDER IN PACKET (EA) ORAL ONCE
Refills: 0 | Status: COMPLETED | OUTPATIENT
Start: 2022-02-23 | End: 2022-02-23

## 2022-02-23 RX ORDER — SITAGLIPTIN 50 MG/1
1 TABLET, FILM COATED ORAL
Qty: 0 | Refills: 0 | DISCHARGE

## 2022-02-23 RX ORDER — OXYCODONE HYDROCHLORIDE 5 MG/1
5 TABLET ORAL EVERY 6 HOURS
Refills: 0 | Status: DISCONTINUED | OUTPATIENT
Start: 2022-02-23 | End: 2022-02-23

## 2022-02-23 RX ORDER — DEXTROSE 50 % IN WATER 50 %
12.5 SYRINGE (ML) INTRAVENOUS ONCE
Refills: 0 | Status: DISCONTINUED | OUTPATIENT
Start: 2022-02-23 | End: 2022-02-23

## 2022-02-23 RX ORDER — SODIUM CHLORIDE 9 MG/ML
1000 INJECTION, SOLUTION INTRAVENOUS
Refills: 0 | Status: DISCONTINUED | OUTPATIENT
Start: 2022-02-23 | End: 2022-02-23

## 2022-02-23 RX ORDER — INSULIN LISPRO 100/ML
VIAL (ML) SUBCUTANEOUS
Refills: 0 | Status: DISCONTINUED | OUTPATIENT
Start: 2022-02-23 | End: 2022-02-23

## 2022-02-23 RX ORDER — ACETAMINOPHEN 500 MG
650 TABLET ORAL EVERY 6 HOURS
Refills: 0 | Status: DISCONTINUED | OUTPATIENT
Start: 2022-02-23 | End: 2022-02-23

## 2022-02-23 RX ORDER — HYDROMORPHONE HYDROCHLORIDE 2 MG/ML
0.5 INJECTION INTRAMUSCULAR; INTRAVENOUS; SUBCUTANEOUS ONCE
Refills: 0 | Status: DISCONTINUED | OUTPATIENT
Start: 2022-02-23 | End: 2022-02-23

## 2022-02-23 RX ORDER — GLUCAGON INJECTION, SOLUTION 0.5 MG/.1ML
1 INJECTION, SOLUTION SUBCUTANEOUS ONCE
Refills: 0 | Status: DISCONTINUED | OUTPATIENT
Start: 2022-02-23 | End: 2022-02-23

## 2022-02-23 RX ORDER — METFORMIN HYDROCHLORIDE 850 MG/1
1 TABLET ORAL
Qty: 0 | Refills: 0 | DISCHARGE

## 2022-02-23 RX ADMIN — Medication 1: at 16:25

## 2022-02-23 RX ADMIN — OXYCODONE HYDROCHLORIDE 5 MILLIGRAM(S): 5 TABLET ORAL at 12:20

## 2022-02-23 RX ADMIN — HYDROMORPHONE HYDROCHLORIDE 0.5 MILLIGRAM(S): 2 INJECTION INTRAMUSCULAR; INTRAVENOUS; SUBCUTANEOUS at 12:48

## 2022-02-23 RX ADMIN — HYDROMORPHONE HYDROCHLORIDE 0.5 MILLIGRAM(S): 2 INJECTION INTRAMUSCULAR; INTRAVENOUS; SUBCUTANEOUS at 12:53

## 2022-02-23 RX ADMIN — OXYCODONE HYDROCHLORIDE 5 MILLIGRAM(S): 5 TABLET ORAL at 12:11

## 2022-02-23 RX ADMIN — Medication 100 GRAM(S): at 17:51

## 2022-02-23 RX ADMIN — Medication 1 PACKET(S): at 18:24

## 2022-02-23 NOTE — ASU DISCHARGE PLAN (ADULT/PEDIATRIC) - NS MD DC FALL RISK RISK
For information on Fall & Injury Prevention, visit: [email or web address] OR  [email or web address] OR  https://www.cdc.gov/steadi/patient.html

## 2022-02-23 NOTE — ASU DISCHARGE PLAN (ADULT/PEDIATRIC) - CARE PROVIDER_API CALL
Jose Seaman)  Otolaryngology  [address], [address], [place] [address]  Phone: [phone]  Fax: [phone]  Follow Up Time:

## 2022-02-23 NOTE — ASU DISCHARGE PLAN (ADULT/PEDIATRIC) - ASU DC SPECIAL INSTRUCTIONSFT
Please follow up with Dr. Seaman in one week; you may call the office to make an appointment at your earliest convenience [phone].  Prescriptions have been sent to you by Dr. Seaman's office. Please take as instructed

## 2022-02-23 NOTE — BRIEF OPERATIVE NOTE - OPERATION/FINDINGS
Horizontal incision made over anterior neck. Platysma and Strap muscles were divided. Right neck exploration performed, right superior parathyroid adenoma noted and resected. Right inferior parathyroid gland confirmed by frozen path was preserved. Fluobeam was utilized to assist in identification of parathyroid glands. Recurrent laryngeal nerve was monitored and preserved. Hemostasis achieved. 10 F channel drain placed. Muscles closed with Vicryl. Skin closed primarily with Prolene.

## 2022-02-23 NOTE — PROGRESS NOTE ADULT - SUBJECTIVE AND OBJECTIVE BOX
POST OP CHECK    Procedure: R superior parathyroidectomy   Surgeon: Dr. Seaman     S: Pt seen and examined at bedside. Reports no acute complaints. Denies dizziness, paresthesias, headache, F, N, V, CP, SOB, TUTTLE, calf tenderness. Pain controlled with medication.    O:  T(C): --  T(F): --  HR: 82 (02-23-22 @ 15:00) (81 - 100)  BP: 136/74 (02-23-22 @ 15:00) (122/60 - 145/76)  RR: 20 (02-23-22 @ 15:00) (13 - 35)  SpO2: 98% (02-23-22 @ 15:00) (98% - 100%)  Wt(kg): --    Gen: NAD, resting comfortably in bed, A&O x3  Neck: Dressing c/d/i. MATT x 1 draining minimal dark SS OP. Surgical site soft, nonttp, no signs of hematoma  C/V: NSR  Pulm: Nonlabored breathing, no respiratory distress  Abd: soft, NT/ND  Extrem: WWP, no calf tenderness or edema, SCDs in place    A/P: 61-year-old female with well-documented primary hyperparathyroidism, osteoporosis and hypercalcemia now s/p Right parathyroidectomy for right superior parathyroid adenoma on 2/23.    Soft CC diet  Pain/nausea control  Labs @ 4pm  MATT drain x 1  AM labs POST OP CHECK    Procedure: R superior parathyroidectomy   Surgeon: Dr. Seaman     S: Pt seen and examined at bedside. Reports no acute complaints. Denies dizziness, paresthesias, headache, F, N, V, CP, SOB, TUTTLE, calf tenderness. Pain controlled with medication.    O:  T(C): --  T(F): --  HR: 82 (02-23-22 @ 15:00) (81 - 100)  BP: 136/74 (02-23-22 @ 15:00) (122/60 - 145/76)  RR: 20 (02-23-22 @ 15:00) (13 - 35)  SpO2: 98% (02-23-22 @ 15:00) (98% - 100%)  Wt(kg): --    Gen: NAD, resting comfortably in bed, A&O x3  Neck: Dressing c/d/i. MATT x 1 draining minimal dark SS OP. Surgical site soft, nonttp, no signs of hematoma  C/V: NSR  Pulm: Nonlabored breathing, no respiratory distress  Abd: soft, NT/ND  Extrem: WWP, no calf tenderness or edema, SCDs in place    A/P: 61-year-old female with well-documented primary hyperparathyroidism, osteoporosis and hypercalcemia now s/p Right parathyroidectomy for right superior parathyroid adenoma on 2/23.    Soft CC diet  Pain/nausea control  Labs @ 4pm  MATT drain x 1  Possible d/c home later today

## 2022-02-24 PROBLEM — H40.9 UNSPECIFIED GLAUCOMA: Chronic | Status: ACTIVE | Noted: 2022-02-22

## 2022-02-24 PROBLEM — Z86.19 PERSONAL HISTORY OF OTHER INFECTIOUS AND PARASITIC DISEASES: Chronic | Status: ACTIVE | Noted: 2022-02-22

## 2022-02-24 PROBLEM — E78.5 HYPERLIPIDEMIA, UNSPECIFIED: Chronic | Status: ACTIVE | Noted: 2022-02-22

## 2022-02-24 PROBLEM — E55.9 VITAMIN D DEFICIENCY, UNSPECIFIED: Chronic | Status: ACTIVE | Noted: 2022-02-22

## 2022-02-24 PROBLEM — J30.2 OTHER SEASONAL ALLERGIC RHINITIS: Chronic | Status: ACTIVE | Noted: 2022-02-22

## 2022-02-24 PROBLEM — E11.9 TYPE 2 DIABETES MELLITUS WITHOUT COMPLICATIONS: Chronic | Status: ACTIVE | Noted: 2022-02-22

## 2022-02-24 PROBLEM — I73.9 PERIPHERAL VASCULAR DISEASE, UNSPECIFIED: Chronic | Status: ACTIVE | Noted: 2022-02-22

## 2022-02-24 PROBLEM — M20.41 OTHER HAMMER TOE(S) (ACQUIRED), RIGHT FOOT: Chronic | Status: ACTIVE | Noted: 2022-02-22

## 2022-02-25 LAB — SURGICAL PATHOLOGY STUDY: SIGNIFICANT CHANGE UP

## 2022-03-01 ENCOUNTER — APPOINTMENT (OUTPATIENT)
Dept: OTOLARYNGOLOGY | Facility: CLINIC | Age: 62
End: 2022-03-01
Payer: COMMERCIAL

## 2022-03-01 VITALS
RESPIRATION RATE: 14 BRPM | TEMPERATURE: 98.1 F | DIASTOLIC BLOOD PRESSURE: 88 MMHG | HEART RATE: 89 BPM | OXYGEN SATURATION: 97 % | SYSTOLIC BLOOD PRESSURE: 150 MMHG

## 2022-03-01 DIAGNOSIS — Z86.03 PERSONAL HISTORY OF NEOPLASM OF UNCERTAIN BEHAVIOR: ICD-10-CM

## 2022-03-01 DIAGNOSIS — E89.822 POSTPROCEDURAL SEROMA OF AN ENDOCRINE SYSTEM ORGAN OR STRUCTURE FOLLOWING AN ENDOCRINE SYSTEM PROCEDURE: ICD-10-CM

## 2022-03-01 PROCEDURE — 10140 I&D HMTMA SEROMA/FLUID COLLJ: CPT | Mod: 78

## 2022-03-01 PROCEDURE — 99024 POSTOP FOLLOW-UP VISIT: CPT

## 2022-03-01 PROCEDURE — 31575 DIAGNOSTIC LARYNGOSCOPY: CPT | Mod: 58

## 2022-03-01 RX ORDER — ACETAMINOPHEN AND CODEINE 300; 30 MG/1; MG/1
300-30 TABLET ORAL
Qty: 12 | Refills: 0 | Status: COMPLETED | COMMUNITY
Start: 2022-02-22 | End: 2022-03-01

## 2022-03-01 RX ORDER — AZITHROMYCIN 500 MG/1
500 TABLET, FILM COATED ORAL DAILY
Qty: 1 | Refills: 0 | Status: COMPLETED | COMMUNITY
Start: 2022-02-22 | End: 2022-03-01

## 2022-03-01 NOTE — PROCEDURE
[Image(s) Captured] : image(s) captured and filed [Unable to Cooperate with Mirror] : patient unable to cooperate with mirror [Gag Reflex] : gag reflex preventing mirror examination [Hoarseness] : hoarseness not clearly evaluated by indirect laryngoscopy [Topical Lidocaine] : topical lidocaine [Oxymetazoline HCl] : oxymetazoline HCl [Flexible Endoscope] : examined with the flexible endoscope [Serial Number: ___] : Serial Number: [unfilled] [de-identified] : The nasal septum is minimally deviated to the right. There are no masses or polyps and the nasal mucosa and secretions are normal. The choanae and posterior nasopharynx are normal without masses or drainage. The Eustachian tube orifices appear patent. The pharynx, including the posterior and lateral pharyngeal walls, the vallecula and base of tongue are normal without ulcerations, lesions or masses. The hypopharynx including the pyriform sinuses open well without pooling of secretions, mucosal lesions or masses. The supraglottic larynx including the epiglottis, petiole, arytenoids, glossoepiglottic, aryepiglottic and pharyngoepiglottic folds are normal without mucosal lesions, ulcerations or masses. The glottis reveals normal false vocal folds. The true vocal folds are hyperemic but tense and of equal length, without paralysis, having symmetric mobility on adduction and abduction. There are no mucosal lesions, nodules, cysts, erythroplasia or leukoplakia. The posterior cricoid area has healthy pink mucosa in the interarytenoid area and esophageal inlet. There is moderate thickening/edema of the interarytenoid mucosa suggestive of posterior laryngitis from laryngopharyngeal acid reflux disease. The trachea is clear without narrowing in the immediate subglottic region, without deviation or lesions.  [de-identified] : postop evaluation after parathyroidectomy.

## 2022-03-01 NOTE — REASON FOR VISIT
[FreeTextEntry2] : a first postoperative visit after parathyroidectomy for primary hyperparathyroidism, osteoporosis and hypercalcemia.  [FreeTextEntry1] : Endocrinologists Ashlyn Chavez MD and Lisa Nelson MD

## 2022-03-01 NOTE — CONSULT LETTER
[Dear  ___] : Dear  [unfilled], [Consult Letter:] : I had the pleasure of evaluating your patient, [unfilled]. [Please see my note below.] : Please see my note below. [Consult Closing:] : Thank you very much for allowing me to participate in the care of this patient.  If you have any questions, please do not hesitate to contact me. [Sincerely,] : Sincerely, [DrJeannie  ___] : Dr. FARR [FreeTextEntry3] : \par Jose Seaman M.D., FACS, ECNU\par Director [facility]roid [facility]\par [facility]\par Certified in Thyroid/Parathyroid/Neck Ultrasound, ECNU/ AIUM\par \par , Department of Otolaryngology\par [facility] [employer] at [facility]\par

## 2022-03-01 NOTE — HISTORY OF PRESENT ILLNESS
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  She has osteopenia in the hip and spine. She has no renal stones on ultrasound and renal function is normal. Her last Ca/PTH 11.5 mg/dl/ 146 pg/ml are consistent with PHPT and she meets current NIH consensus criteria for definitive treatment. Ladi denies recent shortness of breath, voice changes, dysphagia, anterior neck pain, neck pressure or mass. There is no family history of thyroid cancer. She denies any known radiation exposures in her youth.  She denies calcium, vitamin D supplements, HCTZ or past use of Lithium Carbonate. A first cousin had renal stones but other history of renal disease not known.  There is no history of fragility bone fractures. Other than muscle weakness, joint pain, polyuria/ polydipsia, she denies depression, memory loss, brain fog, nausea, vomiting, abdominal pain, constipation, nephrolithiasis, peptic ulcer disease, pancreatitis or GERD. She denies fever, body aches, cough, cyanosis, chest burning, anosmia or recent known COVID exposures.  All family members at home are well. She has been vaccinated.  She has not had parathyroid neck imaging to date. \par  [FreeTextEntry1] : Ladi returns for a first post op visit after an uncomplicated parathyroidectomy on 2/23/2022.  She denies paresthesias and is taking 1 Citracal BID.  Voice is minimally hoarse. Surgical pathology revealed an enlarged hypercellular parathyroid gland weighing 0.380 g and measuring approximately 1.5 cm in greatest dimension.

## 2022-03-01 NOTE — PHYSICAL EXAM
[Normal] : no mass and no adenopathy [de-identified] : The neck is flat without seroma or hematoma. The subcuticular suture was removed. The voice is raspy.  A Chvostek sign was not present.

## 2022-03-03 LAB
ALBUMIN SERPL ELPH-MCNC: 4.5 G/DL
ALP BLD-CCNC: 90 U/L
ALT SERPL-CCNC: 30 U/L
ANION GAP SERPL CALC-SCNC: 14 MMOL/L
AST SERPL-CCNC: 30 U/L
BILIRUB SERPL-MCNC: 0.3 MG/DL
BUN SERPL-MCNC: 12 MG/DL
CALCIUM SERPL-MCNC: 9.6 MG/DL
CALCIUM SERPL-MCNC: 9.6 MG/DL
CHLORIDE SERPL-SCNC: 102 MMOL/L
CO2 SERPL-SCNC: 23 MMOL/L
CREAT SERPL-MCNC: 0.67 MG/DL
EGFR: 100 ML/MIN/1.73M2
GLUCOSE SERPL-MCNC: 117 MG/DL
PARATHYROID HORMONE INTACT: 40 PG/ML
POTASSIUM SERPL-SCNC: 4.4 MMOL/L
PROT SERPL-MCNC: 7.7 G/DL
SODIUM SERPL-SCNC: 139 MMOL/L

## 2022-03-15 ENCOUNTER — APPOINTMENT (OUTPATIENT)
Dept: OTOLARYNGOLOGY | Facility: CLINIC | Age: 62
End: 2022-03-15
Payer: COMMERCIAL

## 2022-03-15 ENCOUNTER — APPOINTMENT (OUTPATIENT)
Dept: ENDOCRINOLOGY | Facility: CLINIC | Age: 62
End: 2022-03-15
Payer: COMMERCIAL

## 2022-03-15 VITALS
DIASTOLIC BLOOD PRESSURE: 89 MMHG | OXYGEN SATURATION: 99 % | HEIGHT: 70 IN | BODY MASS INDEX: 30.78 KG/M2 | SYSTOLIC BLOOD PRESSURE: 152 MMHG | TEMPERATURE: 98 F | HEART RATE: 88 BPM | WEIGHT: 215 LBS

## 2022-03-15 VITALS
WEIGHT: 216 LBS | HEART RATE: 82 BPM | BODY MASS INDEX: 30.99 KG/M2 | SYSTOLIC BLOOD PRESSURE: 153 MMHG | DIASTOLIC BLOOD PRESSURE: 83 MMHG

## 2022-03-15 LAB
GLUCOSE BLDC GLUCOMTR-MCNC: 151
HBA1C MFR BLD HPLC: 6.7

## 2022-03-15 PROCEDURE — 83036 HEMOGLOBIN GLYCOSYLATED A1C: CPT | Mod: QW

## 2022-03-15 PROCEDURE — 99024 POSTOP FOLLOW-UP VISIT: CPT | Mod: 50

## 2022-03-15 PROCEDURE — 82962 GLUCOSE BLOOD TEST: CPT

## 2022-03-15 PROCEDURE — 99214 OFFICE O/P EST MOD 30 MIN: CPT | Mod: 25

## 2022-03-15 RX ORDER — CHOLECALCIFEROL (VITAMIN D3) 1250 MCG
1.25 MG CAPSULE ORAL
Qty: 12 | Refills: 3 | Status: DISCONTINUED | COMMUNITY
Start: 2019-11-01 | End: 2022-03-15

## 2022-03-15 RX ORDER — UBIDECARENONE/VIT E ACET 100MG-5
50 MCG CAPSULE ORAL
Refills: 0 | Status: ACTIVE | COMMUNITY

## 2022-03-15 NOTE — PHYSICAL EXAM
[Normal] : no mass and no adenopathy [de-identified] : The neck is healing well but is exhibiting signs of early scar hypertrophy.  It was injected with Kenalog 10 mg with approximately 0.25 cc.

## 2022-03-15 NOTE — HISTORY OF PRESENT ILLNESS
[FreeTextEntry1] : Ms. Navarro is a 61 year-old woman with a history of type 2 diabetes mellitus, hyperlipidemia, primary hyperparathyroidism presenting for follow-up of her endocrine issues. I saw her for an initial visit in October 2019 and last in May 2021. She was seen by Lisa Nelson NP, in December 2021.\par \par Primary hyperparathyroidism status post parathyroidectomy.\par She was first noted to have hypercalcemia in August 2016 with serum calcium 10.7 mg/dL (normal: 8.6-10.4; albumin 4.1 g/dL). Laboratory tests from September 2019 significant for serum calcium 10.9 mg/dL (normal: 8.6-10.4) with PTH 57 pg/mL (normal: 14-64). Renal function within range. Evaluation at the time of her initial visit confirmed primary hyperparathyroidism. \par No history of nephrolithiasis. Renal imaging in November 2019 without evidence of nephrolithiasis.\par No history of fracture. Bone density from May 2021 significant for T-scores of -1.9 at the lumbar spine, -1.2 at the femoral neck, -0.9 at the total hip, -2.6 at the 1/3 radius. Vertebral fracture assessment without evidence of compression fractures. \par She is status post resection of an enlarged hypercellular right superior parathyroid gland in February 2022 weighing 0.380 g and measuring 1.5 cm in greatest diameter. She has had evidence of biochemical cure postoperatively.\par She has rare dietary calcium intake. She is taking Caltrate 600 mg daily. She has been taking vitamin D 2000 intl units daily.\par Mother and son with history of diabetes. No family history of calcium or other endocrine disorders. \par \par Type 2 diabetes mellitus. Point-of-care HbA1c 6.7% and glucose 151 mg/dL today; HbA1c 7.5% in December 2021. No known history of micro- or macrovascular complications.\par She was diagnosed with diabetes in 2017. No hospitalizations for hypo- or hyperglycemia.\par At her initial visit she was taking metformin 500 mg twice daily. In May 2021 we adjusted metformin to 1000 mg twice daily. In December 2021 we continued metformin 1000 mg twice daily and started Januvia 100 mg daily.\par She is currently taking metformin 1000 mg twice daily and Januvia 100 mg daily.\par She is not on a blood pressure regimen\par She is on a statin for cholesterol\par Nephropathy screening: Urine microalbumin within range in December 2021\par Last ophthalmology appointment: March 2021; upcoming appointment\par Last podiatry appointment: December 2021; upcoming appointment\par Last dental appointment: Over six months\par She declines influenza vaccine; she is up-to-date with COVID-19 (Moderna) vaccine\par \par Interim History \par She is status post resection of an enlarged hypercellular right superior parathyroid gland in February 2022 weighing 0.380 g and measuring 1.5 cm in greatest diameter. She has had evidence of biochemical cure postoperatively.\par She has followed with Dr. Jose Seaman; notes reviewed. Recent laboratory results reviewed. She has had evidence of biochemical cure postoperatively.\par She is currently taking metformin 1000 mg twice daily and Januvia 100 mg daily. She is tolerating Januvia well. \par Her sister passed away last night. She will be travelling to [place]. \par No chest pain, shortness of breath, polyuria/polydipsia, lower extremity numbness/tingling. \par Medical and surgical history, medications, allergies, social and family history reviewed and updated as needed.

## 2022-03-15 NOTE — HISTORY OF PRESENT ILLNESS
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  She has osteopenia in the hip and spine. She has no renal stones on ultrasound and renal function is normal. Her last Ca/PTH 11.5 mg/dl/ 146 pg/ml are consistent with PHPT and she meets current NIH consensus criteria for definitive treatment. Ladi denies recent shortness of breath, voice changes, dysphagia, anterior neck pain, neck pressure or mass. There is no family history of thyroid cancer. She denies any known radiation exposures in her youth.  She denies calcium, vitamin D supplements, HCTZ or past use of Lithium Carbonate. A first cousin had renal stones but other history of renal disease not known.  There is no history of fragility bone fractures. Other than muscle weakness, joint pain, polyuria/ polydipsia, she denies depression, memory loss, brain fog, nausea, vomiting, abdominal pain, constipation, nephrolithiasis, peptic ulcer disease, pancreatitis or GERD. She denies fever, body aches, cough, cyanosis, chest burning, anosmia or recent known COVID exposures.  All family members at home are well. She has been vaccinated.  She has not had parathyroid neck imaging to date. \par  [FreeTextEntry1] : Ladi returns for a 2nd post op visit after an uncomplicated parathyroidectomy on 2/23/2022.  She denies paresthesias and is taking 1 Citracal QD.  Voice is less hoarse. Surgical pathology revealed an enlarged hypercellular parathyroid gland weighing 0.380 g and measuring approximately 1.5 cm in greatest dimension.  Her postop labs revealed a serum calcium of 9.6 mg/DL and intact PTH at 40 PG/mL.  She is now taking vitamin D3 2000 IU daily.

## 2022-03-15 NOTE — CONSULT LETTER
[Dear  ___] : Dear  [unfilled], [Consult Letter:] : I had the pleasure of evaluating your patient, [unfilled]. [Please see my note below.] : Please see my note below. [Consult Closing:] : Thank you very much for allowing me to participate in the care of this patient.  If you have any questions, please do not hesitate to contact me. [Sincerely,] : Sincerely, [FreeTextEntry3] : \par Jose Seaman M.D., FACS, ECNU\par Director Center for Thyroid [facility] Parathyroid Surgery\par [facility]\par Certified in Thyroid/Parathyroid/Neck Ultrasound, ECNU/ AIUM\par \par , Department of Otolaryngology\par [facility] [employer] [facility] at [facility]\par

## 2022-03-15 NOTE — PHYSICAL EXAM
[Alert] : alert [Healthy Appearance] : healthy appearance [No Acute Distress] : no acute distress [Normal Sclera/Conjunctiva] : normal sclera/conjunctiva [Normal Hearing] : hearing was normal [No Respiratory Distress] : no respiratory distress [No Stigmata of Cushings Syndrome] : no stigmata of Cushings Syndrome [Normal Gait] : normal gait [Normal Insight/Judgement] : insight and judgment were intact [Kyphosis] : no kyphosis present [Acanthosis Nigricans] : no acanthosis nigricans [de-identified] : incision clean/dry/intact [de-identified] : no moon facies, no supraclavicular fat pads

## 2022-03-15 NOTE — ASSESSMENT
[FreeTextEntry1] : Primary hyperparathyroidism status post parathyroidectomy. Osteoporosis. She was first noted to have mild hypercalcemia in 2016. She had hypercalcemia with elevated PTH concentrations consistent with primary hyperparathyroidism. She met criteria for parathyroidectomy due to osteoporosis. She is status post resection of an enlarged hypercellular right superior parathyroid gland in February 2022 weighing 0.380 g and measuring 1.5 cm in greatest diameter. She has had evidence of biochemical cure postoperatively. We will plan to monitor bone density one year after successful parathyroidectomy and consider initiation of pharmacologic osteoporosis therapy at that time as needed.\par Interval bone density testing in February 2023\par Calcium 1200 mg daily from diet and supplements (to be taken in divided doses as no more than 500-600 mg can be absorbed at one time); advised increased dietary and/or supplemental calcium\par Continue current vitamin D regimen\par \par Type 2 diabetes mellitus. Point-of-care HbA1c 6.7% and glucose 151 mg/dL today; HbA1c 7.5% in December 2021. No known history of micro- or macrovascular complications. I congratulated her on her excellent glycemic control. We discussed the cardiovascular and microvascular complications of uncontrolled diabetes. We discussed the importance of diet and exercise and lifestyle modification for glycemic control. We discussed pharmacologic options for glycemic control. She is tolerating her current regimen and we will continue.\par Continue metformin 1000 mg twice daily\par Continue Januvia 100 mg daily\par She is not on a blood pressure regimen; blood pressure elevated today but has been around goal and will monitor\par She is on a statin for cholesterol; last lipid panel around goal\par Nephropathy screening: Urine microalbumin within range in December 2021\par Last ophthalmology appointment: March 2021; upcoming appointment\par Last podiatry appointment: December 2021; upcoming appointment\par Last dental appointment: Over six months; advised appointment\par She declines influenza vaccine; she is up-to-date with COVID-19 (Moderna) vaccine; advised pneumonia vaccination\par \par Return to see me in 6 months. Patient advised to call earlier with significant hypo- or hyperglycemia. \par \par I reviewed the bone density testing performed on June 7, 2021 with the patient today.\par I reviewed the laboratories performed from 2016 to present with the patient today. \par I counseled the patient regarding calcium and vitamin D intake today.\par \par CC:\par Dr. Luisana Feliz, Fax [phone]

## 2022-06-16 ENCOUNTER — APPOINTMENT (OUTPATIENT)
Dept: OTOLARYNGOLOGY | Facility: CLINIC | Age: 62
End: 2022-06-16
Payer: COMMERCIAL

## 2022-06-16 VITALS
BODY MASS INDEX: 30.92 KG/M2 | SYSTOLIC BLOOD PRESSURE: 158 MMHG | OXYGEN SATURATION: 98 % | HEART RATE: 84 BPM | WEIGHT: 216 LBS | RESPIRATION RATE: 16 BRPM | HEIGHT: 70 IN | TEMPERATURE: 98 F | DIASTOLIC BLOOD PRESSURE: 92 MMHG

## 2022-06-16 DIAGNOSIS — R49.9 UNSPECIFIED VOICE AND RESONANCE DISORDER: ICD-10-CM

## 2022-06-16 DIAGNOSIS — L91.0 HYPERTROPHIC SCAR: ICD-10-CM

## 2022-06-16 DIAGNOSIS — M85.89 OTHER SPECIFIED DISORDERS OF BONE DENSITY AND STRUCTURE, MULTIPLE SITES: ICD-10-CM

## 2022-06-16 PROCEDURE — 11900 INJECT SKIN LESIONS </W 7: CPT

## 2022-06-16 PROCEDURE — 31575 DIAGNOSTIC LARYNGOSCOPY: CPT

## 2022-06-16 PROCEDURE — 99214 OFFICE O/P EST MOD 30 MIN: CPT | Mod: 25

## 2022-06-16 RX ORDER — POLYMYXIN B SULFATE AND TRIMETHOPRIM 10000; 1 [USP'U]/ML; MG/ML
10000-0.1 SOLUTION OPHTHALMIC
Qty: 10 | Refills: 0 | Status: ACTIVE | COMMUNITY
Start: 2022-06-02

## 2022-06-16 NOTE — CONSULT LETTER
[Dear  ___] : Dear  [unfilled], [Consult Letter:] : I had the pleasure of evaluating your patient, [unfilled]. [Please see my note below.] : Please see my note below. [Consult Closing:] : Thank you very much for allowing me to participate in the care of this patient.  If you have any questions, please do not hesitate to contact me. [Sincerely,] : Sincerely, [DrJeannie  ___] : Dr. FARR [FreeTextEntry3] : \par Jose Seaman M.D., FACS, ECNU\par Director [facility] Parathyroid [facility]\par [facility]\par Certified in Thyroid/Parathyroid/Neck Ultrasound, ECNU/ AIUM\par \par , Department of Otolaryngology\par [facility] at [facility]\par

## 2022-06-16 NOTE — PROCEDURE
[Image(s) Captured] : image(s) captured and filed [Unable to Cooperate with Mirror] : patient unable to cooperate with mirror [Gag Reflex] : gag reflex preventing mirror examination [Topical Lidocaine] : topical lidocaine [Oxymetazoline HCl] : oxymetazoline HCl [Flexible Endoscope] : examined with the flexible endoscope [Hoarseness] : hoarseness not clearly evaluated by indirect laryngoscopy [Serial Number: ___] : Serial Number: [unfilled] [de-identified] : The nasal septum is minimally deviated to the right. There are no masses or polyps and the nasal mucosa and secretions are normal. The choanae and posterior nasopharynx are normal without masses or drainage. The Eustachian tube orifices appear patent. The pharynx, including the posterior and lateral pharyngeal walls, the vallecula and base of tongue are normal without ulcerations, lesions or masses. The hypopharynx including the pyriform sinuses open well without pooling of secretions, mucosal lesions or masses. The supraglottic larynx including the epiglottis, petiole, arytenoids, glossoepiglottic, aryepiglottic and pharyngoepiglottic folds are normal without mucosal lesions, ulcerations or masses. The glottis reveals normal false vocal folds. The true vocal folds are glistening white, tense and of equal length, without paralysis, having symmetric mobility on adduction and abduction. True vocal folds are thickened and there is Pricila edema bilaterally. There are no mucosal lesions, nodules, cysts, erythroplasia or leukoplakia. The posterior cricoid area has healthy pink mucosa in the interarytenoid area and esophageal inlet. There is moderate thickening/edema/tiger striping of the interarytenoid mucosa suggestive of posterior laryngitis from laryngopharyngeal acid reflux disease. The trachea is clear without narrowing in the immediate subglottic region, without deviation or lesions.  [de-identified] : f/u evaluation after parathyroidectomy, multiple subcentimeter thyroid nodules.

## 2022-06-16 NOTE — HISTORY OF PRESENT ILLNESS
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  She has osteopenia in the hip and spine. She has no renal stones on ultrasound and renal function is normal. Her last Ca/PTH 11.5 mg/dl/ 146 pg/ml are consistent with PHPT and she meets current NIH consensus criteria for definitive treatment. Ladi denies recent shortness of breath, voice changes, dysphagia, anterior neck pain, neck pressure or mass. There is no family history of thyroid cancer. She denies any known radiation exposures in her youth.  She denies calcium, vitamin D supplements, HCTZ or past use of Lithium Carbonate. A first cousin had renal stones but other history of renal disease not known.  There is no history of fragility bone fractures. Other than muscle weakness, joint pain, polyuria/ polydipsia, she denies depression, memory loss, brain fog, nausea, vomiting, abdominal pain, constipation, nephrolithiasis, peptic ulcer disease, pancreatitis or GERD. She denies fever, body aches, cough, cyanosis, chest burning, anosmia or recent known COVID exposures.  All family members at home are well. She has been vaccinated.  She has not had parathyroid neck imaging to date. \par  [FreeTextEntry1] : Ladi returns for a f/u visit after an uncomplicated parathyroidectomy on 2/23/2022.  She is taking 1 Citracal QD and vitamin D3 2K IU daily. Voice is less hoarse. Surgical pathology revealed an enlarged hypercellular parathyroid gland weighing 0.380 g and measuring approximately 1.5 cm in greatest dimension.  Her immediate postop labs revealed a serum calcium of 9.6 mg/DL and intact PTH at 40 PG/mL. Overall she feels well and has healed well. Preop US revealed multiple bilateral nonsuspicious subcentimeter thyroid nodules.

## 2022-06-16 NOTE — REASON FOR VISIT
[FreeTextEntry2] : a f/u visit after parathyroidectomy for primary hyperparathyroidism, osteoporosis and hypercalcemia.  [FreeTextEntry1] : Endocrinologists Ashlyn Chavez MD and Lisa Nelson MD

## 2022-06-21 LAB
25(OH)D3 SERPL-MCNC: 35.9 NG/ML
ALBUMIN SERPL ELPH-MCNC: 4.7 G/DL
ALP BLD-CCNC: 72 U/L
ALT SERPL-CCNC: 47 U/L
ANION GAP SERPL CALC-SCNC: 11 MMOL/L
AST SERPL-CCNC: 48 U/L
BILIRUB SERPL-MCNC: 0.5 MG/DL
BUN SERPL-MCNC: 13 MG/DL
CALCIUM SERPL-MCNC: 9.8 MG/DL
CALCIUM SERPL-MCNC: 9.8 MG/DL
CHLORIDE SERPL-SCNC: 101 MMOL/L
CO2 SERPL-SCNC: 27 MMOL/L
CREAT SERPL-MCNC: 0.64 MG/DL
EGFR: 101 ML/MIN/1.73M2
GLUCOSE SERPL-MCNC: 141 MG/DL
PARATHYROID HORMONE INTACT: 40 PG/ML
POTASSIUM SERPL-SCNC: 4.3 MMOL/L
PROT SERPL-MCNC: 8 G/DL
SODIUM SERPL-SCNC: 139 MMOL/L
TSH SERPL-ACNC: 1.94 UIU/ML

## 2022-06-24 ENCOUNTER — APPOINTMENT (OUTPATIENT)
Dept: BREAST CENTER | Facility: CLINIC | Age: 62
End: 2022-06-24
Payer: COMMERCIAL

## 2022-06-24 VITALS
DIASTOLIC BLOOD PRESSURE: 85 MMHG | BODY MASS INDEX: 31.92 KG/M2 | HEART RATE: 84 BPM | HEIGHT: 70 IN | WEIGHT: 223 LBS | SYSTOLIC BLOOD PRESSURE: 162 MMHG

## 2022-06-24 DIAGNOSIS — Z72.89 OTHER PROBLEMS RELATED TO LIFESTYLE: ICD-10-CM

## 2022-06-24 PROCEDURE — 99213 OFFICE O/P EST LOW 20 MIN: CPT

## 2022-06-24 RX ORDER — METFORMIN HYDROCHLORIDE 1000 MG/1
1000 TABLET, COATED ORAL TWICE DAILY
Qty: 180 | Refills: 3 | Status: COMPLETED | COMMUNITY
End: 2022-06-24

## 2022-07-27 ENCOUNTER — RX RENEWAL (OUTPATIENT)
Age: 62
End: 2022-07-27

## 2022-12-08 ENCOUNTER — APPOINTMENT (OUTPATIENT)
Dept: OTOLARYNGOLOGY | Facility: CLINIC | Age: 62
End: 2022-12-08

## 2022-12-08 VITALS
HEART RATE: 90 BPM | WEIGHT: 223 LBS | BODY MASS INDEX: 31.92 KG/M2 | TEMPERATURE: 98 F | SYSTOLIC BLOOD PRESSURE: 142 MMHG | OXYGEN SATURATION: 100 % | HEIGHT: 70 IN | DIASTOLIC BLOOD PRESSURE: 84 MMHG

## 2022-12-08 PROCEDURE — 31575 DIAGNOSTIC LARYNGOSCOPY: CPT

## 2022-12-08 PROCEDURE — 99214 OFFICE O/P EST MOD 30 MIN: CPT | Mod: 25

## 2022-12-08 NOTE — PROCEDURE
[Image(s) Captured] : image(s) captured and filed [Unable to Cooperate with Mirror] : patient unable to cooperate with mirror [Gag Reflex] : gag reflex preventing mirror examination [Hoarseness] : hoarseness not clearly evaluated by indirect laryngoscopy [Topical Lidocaine] : topical lidocaine [Oxymetazoline HCl] : oxymetazoline HCl [Flexible Endoscope] : examined with the flexible endoscope [Serial Number: ___] : Serial Number: [unfilled] [de-identified] : The nasal septum is minimally deviated to the right. There are no masses or polyps and the nasal mucosa and secretions are normal. The choanae and posterior nasopharynx are normal without masses or drainage. The Eustachian tube orifices appear patent. The pharynx, including the posterior and lateral pharyngeal walls, the vallecula and base of tongue are normal without ulcerations, lesions or masses. The hypopharynx including the pyriform sinuses open well without pooling of secretions, mucosal lesions or masses. The supraglottic larynx including the epiglottis, petiole, arytenoids, glossoepiglottic, aryepiglottic and pharyngoepiglottic folds are normal without mucosal lesions, ulcerations or masses. The glottis reveals normal false vocal folds. The true vocal folds are glistening white, tense and of equal length, without paralysis, having symmetric mobility on adduction and abduction. True vocal folds are thickened and there is Pricila edema bilaterally. There are no mucosal lesions, nodules, cysts, erythroplasia or leukoplakia. The posterior cricoid area has healthy pink mucosa in the interarytenoid area and esophageal inlet. There is moderate pachydermia/tiger striping of the interarytenoid mucosa suggestive of posterior laryngitis from laryngopharyngeal acid reflux disease. The trachea is clear without narrowing in the immediate subglottic region, without deviation or lesions.  [de-identified] : f/u evaluation after parathyroidectomy, multiple subcentimeter thyroid nodules, former smoker

## 2022-12-08 NOTE — HISTORY OF PRESENT ILLNESS
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  She has osteopenia in the hip and spine. She has no renal stones on ultrasound and renal function is normal. Her last Ca/PTH 11.5 mg/dl/ 146 pg/ml are consistent with PHPT and she meets current NIH consensus criteria for definitive treatment. Ladi denies recent shortness of breath, voice changes, dysphagia, anterior neck pain, neck pressure or mass. There is no family history of thyroid cancer. She denies any known radiation exposures in her youth.  She denies calcium, vitamin D supplements, HCTZ or past use of Lithium Carbonate. A first cousin had renal stones but other history of renal disease not known.  There is no history of fragility bone fractures. Other than muscle weakness, joint pain, polyuria/ polydipsia, she denies depression, memory loss, brain fog, nausea, vomiting, abdominal pain, constipation, nephrolithiasis, peptic ulcer disease, pancreatitis or GERD. She denies fever, body aches, cough, cyanosis, chest burning, anosmia or recent known COVID exposures.  All family members at home are well. She has been vaccinated.  She has not had parathyroid neck imaging to date. \par  [FreeTextEntry1] : Ladi returns for a f/u visit after an uncomplicated parathyroidectomy on 2/23/2022.  She is taking 1 Citracal QD and vitamin D3 2K IU daily. Voice is less hoarse. Surgical pathology revealed an enlarged hypercellular parathyroid gland weighing 0.380 g and measuring approximately 1.5 cm in greatest dimension.  Her immediate postop labs revealed a serum calcium of 9.6 mg/DL and intact PTH at 40 PG/mL. Overall she feels well and has healed well. Preop US revealed multiple bilateral nonsuspicious subcentimeter thyroid nodules.

## 2022-12-08 NOTE — DATA REVIEWED
[de-identified] : see HPI  [de-identified] : op notes, path reports, Endocrine notes all reviewed

## 2022-12-08 NOTE — CONSULT LETTER
[Dear  ___] : Dear  [unfilled], [Consult Letter:] : I had the pleasure of evaluating your patient, [unfilled]. [Please see my note below.] : Please see my note below. [Consult Closing:] : Thank you very much for allowing me to participate in the care of this patient.  If you have any questions, please do not hesitate to contact me. [Sincerely,] : Sincerely, [DrJeannie  ___] : Dr. FARR [FreeTextEntry3] : \par Jose Seaman M.D., FACS, ECNU\par Director Center for Thyroid [facility] Parathyroid Surgery\par [facility]\par Certified in Thyroid/Parathyroid/Neck Ultrasound, ECNU/ AIUM\par \par , Department of Otolaryngology\par [facility] at [facility]\par

## 2022-12-09 LAB
25(OH)D3 SERPL-MCNC: 24 NG/ML
ALBUMIN SERPL ELPH-MCNC: 4.5 G/DL
CALCIUM SERPL-MCNC: 10 MG/DL
CALCIUM SERPL-MCNC: 10 MG/DL
PARATHYROID HORMONE INTACT: 56 PG/ML
TSH SERPL-ACNC: 0.96 UIU/ML

## 2023-02-08 ENCOUNTER — APPOINTMENT (OUTPATIENT)
Dept: ENDOCRINOLOGY | Facility: CLINIC | Age: 63
End: 2023-02-08
Payer: COMMERCIAL

## 2023-02-08 VITALS
HEART RATE: 79 BPM | WEIGHT: 222 LBS | SYSTOLIC BLOOD PRESSURE: 150 MMHG | DIASTOLIC BLOOD PRESSURE: 85 MMHG | BODY MASS INDEX: 31.85 KG/M2

## 2023-02-08 LAB
GLUCOSE BLDC GLUCOMTR-MCNC: 146
HBA1C MFR BLD HPLC: 7.8

## 2023-02-08 PROCEDURE — 82962 GLUCOSE BLOOD TEST: CPT

## 2023-02-08 PROCEDURE — 99214 OFFICE O/P EST MOD 30 MIN: CPT | Mod: 25

## 2023-02-08 PROCEDURE — 36415 COLL VENOUS BLD VENIPUNCTURE: CPT

## 2023-02-08 PROCEDURE — 83036 HEMOGLOBIN GLYCOSYLATED A1C: CPT | Mod: QW

## 2023-02-08 RX ORDER — DULAGLUTIDE 0.75 MG/.5ML
0.75 INJECTION, SOLUTION SUBCUTANEOUS
Qty: 1 | Refills: 0 | Status: COMPLETED | COMMUNITY
Start: 2023-02-08 | End: 2023-03-10

## 2023-02-08 RX ORDER — SITAGLIPTIN 100 MG/1
100 TABLET, FILM COATED ORAL DAILY
Qty: 90 | Refills: 1 | Status: DISCONTINUED | COMMUNITY
Start: 2021-12-13 | End: 2023-02-08

## 2023-02-09 LAB
CHOLEST SERPL-MCNC: 197 MG/DL
HDLC SERPL-MCNC: 81 MG/DL
LDLC SERPL CALC-MCNC: 104 MG/DL
NONHDLC SERPL-MCNC: 116 MG/DL
TRIGL SERPL-MCNC: 59 MG/DL
VIT B12 SERPL-MCNC: 766 PG/ML

## 2023-02-10 LAB
CREAT SPEC-SCNC: 179 MG/DL
MICROALBUMIN 24H UR DL<=1MG/L-MCNC: 10.4 MG/DL
MICROALBUMIN/CREAT 24H UR-RTO: 58 MG/G

## 2023-02-16 NOTE — HISTORY OF PRESENT ILLNESS
[FreeTextEntry1] : Ms. Navarro is a 62 year-old woman with a history of type 2 diabetes mellitus, hyperlipidemia, primary hyperparathyroidism status post parathyroidectomy presenting for follow-up of her endocrine issues. I saw her for an initial visit in October 2019 and last in March 2022.\par \par Primary hyperparathyroidism status post parathyroidectomy.\par She was first noted to have hypercalcemia in August 2016 with serum calcium 10.7 mg/dL (normal: 8.6-10.4; albumin 4.1 g/dL). Laboratory tests from September 2019 significant for serum calcium 10.9 mg/dL (normal: 8.6-10.4) with PTH 57 pg/mL (normal: 14-64). Renal function within range. Evaluation at the time of her initial visit confirmed primary hyperparathyroidism. \par No history of nephrolithiasis. Renal imaging in November 2019 without evidence of nephrolithiasis.\par No history of fracture. Bone density from May 2021 significant for T-scores of -1.9 at the lumbar spine, -1.2 at the femoral neck, -0.9 at the total hip, -2.6 at the 1/3 radius. Vertebral fracture assessment without evidence of compression fractures. \par She is status post resection of an enlarged hypercellular right superior parathyroid gland in February 2022 weighing 0.380 g and measuring 1.5 cm in greatest diameter. She has had evidence of biochemical cure postoperatively.\par She has 1 serving of dairy per day (milk, cheese, or yogurt). She is taking Caltrate 600 mg daily. She has been taking vitamin D 4000 intl units daily - increased from December 2022.\par Mother and son with history of diabetes. No family history of calcium or other endocrine disorders. \par \par Type 2 diabetes mellitus. Point-of-care HbA1c 7.8% and glucose 146 mg/dL today; HbA1c 6.7% in March 2022, 7.5% in December 2021. No known history of micro- or macrovascular complications.\par She was diagnosed with diabetes in 2017. No hospitalizations for hypo- or hyperglycemia.\par At her initial visit she was taking metformin 500 mg twice daily. In May 2021 we adjusted metformin to 1000 mg twice daily. In December 2021 we continued metformin 1000 mg twice daily and started Januvia 100 mg daily.\par She is currently taking metformin 1000 mg twice daily and Januvia 100 mg daily.\par She is not on a blood pressure regimen\par She is on a statin for cholesterol\par Nephropathy screening: Due\par Last ophthalmology appointment: November 2022; every 6 months\par Last podiatry appointment: December 2022; every 3 months\par Last dental appointment: Over six months\par \par Interim History \par She is currently taking metformin 1000 mg twice daily and Januvia 100 mg daily. \par She had an epidural injection for back pain few weeks ago. She has had decreased physical activity due to pain. \par She has seen Dr. Jose Seaman; note reviewed. Last laboratory results reviewed. Calciotropic panel within range.\par No chest pain, shortness of breath, polyuria/polydipsia, lower extremity numbness/tingling. \par Medical and surgical history, medications, allergies, social and family history reviewed and updated as needed.

## 2023-02-16 NOTE — PHYSICAL EXAM
[Alert] : alert [Healthy Appearance] : healthy appearance [No Acute Distress] : no acute distress [Normal Sclera/Conjunctiva] : normal sclera/conjunctiva [Normal Hearing] : hearing was normal [No Respiratory Distress] : no respiratory distress [No Stigmata of Cushings Syndrome] : no stigmata of Cushings Syndrome [Normal Gait] : normal gait [Normal Insight/Judgement] : insight and judgment were intact [Kyphosis] : no kyphosis present [Acanthosis Nigricans] : no acanthosis nigricans [de-identified] : incision clean/dry/intact [de-identified] : no moon facies, no supraclavicular fat pads

## 2023-02-16 NOTE — ADDENDUM
[FreeTextEntry1] : Recent laboratory results as below; discussed with Ms. Navarro. Lipid panel not at goal and recommend adjustment in atorvastatin from 20 to 40 mg daily. Urine microalbumin elevated and we can monitor with improved glycemic control. Vitamin B12 within the normal range. 2/10/23\par \par Recent bone density as below; discussed with Ms. Navarro. There is osteoporosis at the distal radius and we will discuss pharmacologic osteoporosis therapy at her upcoming appointment. Vertebral fracture assessment without evidence of compression fractures. 2/16/23\par \par Most recent bone mineral density\par Date: February 13, 2023\par Source: Hologic\par Site: [facility]\par \par Site	BMD	T-score	Change previous	Change baseline	\par Lumbar spine	0.826	-2.3			\par Femoral neck	0.705	-1.3			\par Total hip                  	0.808	-1.1			\par Distal radius	0.544	-2.5			\par DXA comments: Baseline scan at this facility; L1 excluded; left hip values\par Vertebral fracture assessment without evidence of compression fractures T7-L5 (my read)\par \par Impression: I have personally reviewed the DXA images and report. There is osteoporosis by the World Health Organization criteria. Both L1 and L4 were excluded on the report from [facility]; I agree with exclusion of L1, however, L4 is within one standard deviation of the other vertebrae and included above. Vertebral fracture assessment without evidence of compression fractures.

## 2023-02-16 NOTE — ASSESSMENT
[FreeTextEntry1] : Primary hyperparathyroidism status post parathyroidectomy. Osteoporosis. She was first noted to have mild hypercalcemia in 2016. She had hypercalcemia with elevated PTH concentrations consistent with primary hyperparathyroidism. She met criteria for parathyroidectomy due to osteoporosis. She is status post resection of an enlarged hypercellular right superior parathyroid gland in February 2022 weighing 0.380 g and measuring 1.5 cm in greatest diameter. She has had evidence of biochemical cure postoperatively. We will plan to monitor bone density testing now one year after successful parathyroidectomy and consider initiation of pharmacologic osteoporosis therapy as needed.\par Interval bone density testing\par Calcium 5530-2148 mg daily from diet and supplements (to be taken in divided doses as no more than 500-600 mg can be absorbed at one time); continue current regimen\par Continue current vitamin D regimen\par \par Type 2 diabetes mellitus. Point-of-care HbA1c 7.8% and glucose 146 mg/dL today. No known history of micro- or macrovascular complications. We discussed the cardiovascular and microvascular complications of uncontrolled diabetes. We discussed the importance of diet and exercise and lifestyle modification for glycemic control. We discussed pharmacologic options for glycemic control. She is amenable to a trial of a GLP-1 receptor agonist. We discussed the risks and benefits of the GLP-1 receptor agonist class, including but not limited to nausea, pancreatitis, medullary thyroid cancer. We reviewed subcutaneous injection technique, storage, and sharps disposal. \par Continue metformin 1000 mg twice daily\par Stop Januvia and start Trulicity 0.75 mg weekly for four weeks, then 1.5 mg weekly if covered by insurance\par She is not on a blood pressure regimen; blood pressure elevated today but ambulatory values within range per her report\par She is on a statin for cholesterol; last lipid panel around goal\par Nephropathy screening: Due\par Last ophthalmology appointment: November 2022; every 6 months\par Last podiatry appointment: December 2022; every 3 months\par Last dental appointment: Over six months; advised appointment\par \par Return to see me in 3-4 months. Patient advised to call earlier with significant hypo- or hyperglycemia. \par \par I reviewed the bone density testing performed on June 7, 2021 with the patient today.\par I reviewed the laboratories performed from June 2022 to present with the patient today. \par I counseled the patient regarding calcium and vitamin D intake today.\par \par CC:\par Dr. Luisana Feliz, Fax [phone]

## 2023-05-24 NOTE — ASU PATIENT PROFILE, ADULT - NSALCOHOLTYPE_GEN__A_CORE_SD
----- Message from Jori Barr sent at 5/24/2023 11:48 AM CDT -----  Regarding: mammo orders  Pt is requesting a mammogram order. Call back # [phone]    
wine

## 2023-06-09 ENCOUNTER — APPOINTMENT (OUTPATIENT)
Dept: ENDOCRINOLOGY | Facility: CLINIC | Age: 63
End: 2023-06-09
Payer: COMMERCIAL

## 2023-06-09 VITALS
BODY MASS INDEX: 29.7 KG/M2 | WEIGHT: 207 LBS | SYSTOLIC BLOOD PRESSURE: 156 MMHG | HEART RATE: 93 BPM | DIASTOLIC BLOOD PRESSURE: 90 MMHG

## 2023-06-09 LAB
GLUCOSE BLDC GLUCOMTR-MCNC: 104
HBA1C MFR BLD HPLC: 5.7

## 2023-06-09 PROCEDURE — 83036 HEMOGLOBIN GLYCOSYLATED A1C: CPT | Mod: QW

## 2023-06-09 PROCEDURE — 99214 OFFICE O/P EST MOD 30 MIN: CPT | Mod: 25

## 2023-06-09 PROCEDURE — 82962 GLUCOSE BLOOD TEST: CPT

## 2023-06-09 RX ORDER — FLASH GLUCOSE SENSOR
KIT MISCELLANEOUS
Qty: 6 | Refills: 0 | Status: ACTIVE | COMMUNITY
Start: 2021-04-12

## 2023-06-12 LAB
25(OH)D3 SERPL-MCNC: 31.4 NG/ML
ALBUMIN SERPL ELPH-MCNC: 4.7 G/DL
ALP BLD-CCNC: 64 U/L
ALT SERPL-CCNC: 33 U/L
ANION GAP SERPL CALC-SCNC: 13 MMOL/L
AST SERPL-CCNC: 43 U/L
BILIRUB SERPL-MCNC: 0.5 MG/DL
BUN SERPL-MCNC: 11 MG/DL
CALCIUM SERPL-MCNC: 9.6 MG/DL
CHLORIDE SERPL-SCNC: 102 MMOL/L
CHOLEST SERPL-MCNC: 177 MG/DL
CO2 SERPL-SCNC: 27 MMOL/L
CREAT SERPL-MCNC: 0.99 MG/DL
EGFR: 65 ML/MIN/1.73M2
GLUCOSE SERPL-MCNC: 101 MG/DL
HDLC SERPL-MCNC: 79 MG/DL
LDLC SERPL CALC-MCNC: 84 MG/DL
NONHDLC SERPL-MCNC: 98 MG/DL
POTASSIUM SERPL-SCNC: 4.3 MMOL/L
PROT SERPL-MCNC: 7.9 G/DL
SODIUM SERPL-SCNC: 142 MMOL/L
TRIGL SERPL-MCNC: 74 MG/DL

## 2023-06-12 NOTE — ADDENDUM
[FreeTextEntry1] : Recent laboratory results as below; discussed with Ms. Navarro. Lipid panel within the normal range. AST elevated as per previous and recommended follow-up with primary care. Serum calcium, renal function, and vitamin D within the normal range; we can proceed with zoledronic acid therapy pending insurance authorization. 6/12/23

## 2023-06-12 NOTE — DATA REVIEWED
[FreeTextEntry1] : Most recent bone mineral density\par Date: February 13, 2023\par Source: Hologic\par Site: [facility]\par \par Site	BMD	T-score	Change previous	Change baseline	\par Lumbar spine	0.826	-2.3			\par Femoral neck	0.705	-1.3			\par Total hip                  	0.808	-1.1			\par Distal radius	0.544	-2.5			\par DXA comments: Baseline scan at this facility; L1 excluded; left hip values\par Vertebral fracture assessment without evidence of compression fractures T7-L5 (my read)\par \par Impression: I have personally reviewed the DXA images and report. There is osteoporosis by the World Health Organization criteria. Both L1 and L4 were excluded on the report from [facility]; I agree with exclusion of L1, however, L4 is within one standard deviation of the other vertebrae and included above. Vertebral fracture assessment without evidence of compression fractures.

## 2023-06-12 NOTE — PHYSICAL EXAM
[Alert] : alert [Healthy Appearance] : healthy appearance [No Acute Distress] : no acute distress [Normal Sclera/Conjunctiva] : normal sclera/conjunctiva [Normal Hearing] : hearing was normal [No Respiratory Distress] : no respiratory distress [No Stigmata of Cushings Syndrome] : no stigmata of Cushings Syndrome [Normal Gait] : normal gait [Normal Insight/Judgement] : insight and judgment were intact [Well Healed Scar] : well healed scar [Kyphosis] : no kyphosis present [Acanthosis Nigricans] : no acanthosis nigricans [de-identified] : no moon facies, no supraclavicular fat pads

## 2023-06-12 NOTE — HISTORY OF PRESENT ILLNESS
[FreeTextEntry1] : Ms. Navarro is a 62 year-old woman with a history of type 2 diabetes mellitus, hyperlipidemia, primary hyperparathyroidism status post parathyroidectomy presenting for follow-up of her endocrine issues. I saw her for an initial visit in October 2019 and last in February 2023.\par \par Primary hyperparathyroidism status post parathyroidectomy.\par She was first noted to have hypercalcemia in August 2016 with serum calcium 10.7 mg/dL (normal: 8.6-10.4; albumin 4.1 g/dL). Laboratory tests from September 2019 significant for serum calcium 10.9 mg/dL (normal: 8.6-10.4) with PTH 57 pg/mL (normal: 14-64). Renal function within range. Evaluation at the time of her initial visit confirmed primary hyperparathyroidism. \par No history of nephrolithiasis. Renal imaging in November 2019 without evidence of nephrolithiasis.\par No history of fracture. Bone density from May 2021 significant for T-scores of -1.9 at the lumbar spine, -1.2 at the femoral neck, -0.9 at the total hip, -2.6 at the 1/3 radius. Vertebral fracture assessment without evidence of compression fractures. Most recent bone density from February 2023 significant for T-scores of -2.3 at the lumbar spine (L1-L4), -1.3 at the femoral neck, -1.1 at the total hip, and -2.5 at the distal radius. \par She is status post resection of an enlarged hypercellular right superior parathyroid gland in February 2022 weighing 0.380 g and measuring 1.5 cm in greatest diameter. She has had evidence of biochemical cure postoperatively.\par She has 1 serving of dairy per day (milk, cheese, or yogurt). She is taking Caltrate 600 mg daily. She has been taking vitamin D 4000 intl units daily.\par Mother and son with history of diabetes. No family history of calcium or other endocrine disorders. \par \par Type 2 diabetes mellitus. Point-of-care HbA1c 5.7% and glucose 104 mg/dL today; HbA1c 7.8% in February 2023, 6.7% in March 2022, 7.5% in December 2021. No known history of micro- or macrovascular complications.\par She was diagnosed with diabetes in 2017. No hospitalizations for hypo- or hyperglycemia.\par At her initial visit she was taking metformin 500 mg twice daily. In May 2021 we adjusted metformin to 1000 mg twice daily. In December 2021 we continued metformin 1000 mg twice daily and started Januvia 100 mg daily.\par In February 2023 we continued metformin 1000 mg twice daily, stopped Januvia, and started Trulicity up to 1.5 mg weekly.\par She is not on a blood pressure regimen\par She is on a statin for cholesterol\par Nephropathy screening: Elevated urine microalbumin; will reassess with improvement in glycemic control\par Last ophthalmology appointment: May 2023; every 6 months\par Last podiatry appointment: March 2023; every 3 months\par Last dental appointment: Over six months; advised appointment\par \par Interim History \par In February 2023 we continued metformin 1000 mg twice daily, stopped Januvia, and started Trulicity up to 1.5 mg weekly.\par Laboratory results from last visit as below. Lipid panel not at goal and recommend adjustment in atorvastatin from 20 to 40 mg daily. Urine microalbumin elevated and we can monitor with improved glycemic control. Vitamin B12 within the normal range. \par Recent bone density as below. There is osteoporosis at the distal radius. Vertebral fracture assessment without evidence of compression fractures. \par She has had persistent back pain and is following with a pain medicine provider.\par No chest pain, shortness of breath, polyuria/polydipsia, lower extremity numbness/tingling. \par She will be going on a [employer] cruise and going to [place] for her birthday.\par Medical and surgical history, medications, allergies, social and family history reviewed and updated as needed.

## 2023-06-12 NOTE — ASSESSMENT
[FreeTextEntry1] : Type 2 diabetes mellitus. Point-of-care HbA1c 5.7% and glucose 104 mg/dL today. No known history of micro- or macrovascular complications. I congratulated her on her excellent glycemic control. We discussed the cardiovascular and microvascular complications of uncontrolled diabetes. We discussed the importance of diet and exercise and lifestyle modification for glycemic control. We discussed pharmacologic options for glycemic control. She is tolerating her current regimen and we will continue.\par Continue metformin 1000 mg twice daily\par Continue Trulicity 1.5 mg weekly\par She is not on a blood pressure regimen; blood pressure elevated today but ambulatory values within range per her report\par She is on a statin for cholesterol; monitor after adjustment in atorvastatin last visit\par Nephropathy screening: Repeat next visit to ensure improvement with improved glycemic control\par Last ophthalmology appointment: May 2023; every 6 months\par Last podiatry appointment: March 2023; every 3 months\par Last dental appointment: Over six months; advised appointment\par \par Primary hyperparathyroidism status post parathyroidectomy. Osteoporosis. She was first noted to have mild hypercalcemia in 2016. She had hypercalcemia with elevated PTH concentrations consistent with primary hyperparathyroidism. She met criteria for parathyroidectomy due to osteoporosis. She is status post resection of an enlarged hypercellular right superior parathyroid gland in February 2022 weighing 0.380 g and measuring 1.5 cm in greatest diameter. She has had evidence of biochemical cure postoperatively. Her recent bone density has demonstrated persistent osteoporosis at the distal radius. We discussed the potential benefits and risks of antiresorptive osteoporosis therapy, including but not limited to osteonecrosis of the jaw and atypical femoral fracture. She is amenable to therapy with zoledronic pending insurance authorization. The below instructions were given regarding zoledronic acid infusion. \par Zoledronic acid 5 mg IV pending insurance authorization\par Calcium 1713-4317 mg daily from diet and supplements (to be taken in divided doses as no more than 500-600 mg can be absorbed at one time); continue current regimen\par Continue current vitamin D regimen pending level\par \par Instructions for zoledronic acid:\par Drink at least 32 oz (~4 glasses) of water the day you have the infusion. If this is your first infusion, take acetaminophen (Tylenol) 500-1000 mg every 8 hours the day of and the day after your infusion, starting in the morning before coming to the hospital for your infusion. Be careful if you are taking other products with acetaminophen that you do not exceed the daily recommended dose. \par \par Return to see me in 4 months. Patient advised to call earlier with significant hypo- or hyperglycemia. \par \par I reviewed the bone density testing performed on February 13, 2023 with the patient today.\par I reviewed the laboratories performed on February 8, 2023 to present with the patient today. \par I counseled the patient regarding calcium and vitamin D intake today.\par \par CC:\par Dr. Luisana Feliz, Fax [phone]

## 2023-06-16 ENCOUNTER — NON-APPOINTMENT (OUTPATIENT)
Age: 63
End: 2023-06-16

## 2023-06-26 ENCOUNTER — APPOINTMENT (OUTPATIENT)
Dept: BREAST CENTER | Facility: CLINIC | Age: 63
End: 2023-06-26
Payer: COMMERCIAL

## 2023-06-26 ENCOUNTER — NON-APPOINTMENT (OUTPATIENT)
Age: 63
End: 2023-06-26

## 2023-06-26 VITALS
WEIGHT: 205 LBS | DIASTOLIC BLOOD PRESSURE: 94 MMHG | BODY MASS INDEX: 29.35 KG/M2 | HEIGHT: 70 IN | HEART RATE: 83 BPM | SYSTOLIC BLOOD PRESSURE: 161 MMHG

## 2023-06-26 DIAGNOSIS — Z80.3 FAMILY HISTORY OF MALIGNANT NEOPLASM OF BREAST: ICD-10-CM

## 2023-06-26 DIAGNOSIS — Z78.9 OTHER SPECIFIED HEALTH STATUS: ICD-10-CM

## 2023-06-26 DIAGNOSIS — D05.00 LOBULAR CARCINOMA IN SITU OF UNSPECIFIED BREAST: ICD-10-CM

## 2023-06-26 DIAGNOSIS — Z87.891 PERSONAL HISTORY OF NICOTINE DEPENDENCE: ICD-10-CM

## 2023-06-26 PROCEDURE — 99213 OFFICE O/P EST LOW 20 MIN: CPT

## 2023-06-28 NOTE — PAST MEDICAL HISTORY
[History of Hormone Replacement Treatment] : has no history of hormone replacement treatment [FreeTextEntry6] : no [FreeTextEntry7] : yes [FreeTextEntry8] : no

## 2023-06-28 NOTE — HISTORY OF PRESENT ILLNESS
[FreeTextEntry1] : Patient is a 63yo F who presents today for breast cancer screening. She has hx of RIGHT excisional bx for LCIS in 2009 (age 48). Denies family history of breast or ovarian cancer. Patient denies palpable masses, skin changes, or nipple discharge bilaterally.\par \par 4/29/19: B/l MG- no evidence of disease\par 5/1/20: B/l MG- ILDA\par 5/3/21: B/l MG- heterogenously dense. ILDA. BI-RADS 1\par 6/24/22: B/l MG/US- heterogenously dense. ILDA. BI-RADS 1\par 6/26/23: B/l MG/US- heterogenously dense. ILDA. BI-RADS 2

## 2023-06-28 NOTE — PHYSICAL EXAM
[de-identified] : Bilateral breast/axilla/supraclavicular area: No masses, discharge, or adenopathy\par

## 2023-07-07 ENCOUNTER — APPOINTMENT (OUTPATIENT)
Dept: INFUSION THERAPY | Facility: CLINIC | Age: 63
End: 2023-07-07

## 2023-07-07 ENCOUNTER — OUTPATIENT (OUTPATIENT)
Dept: OUTPATIENT SERVICES | Facility: HOSPITAL | Age: 63
LOS: 1 days | End: 2023-07-07
Payer: COMMERCIAL

## 2023-07-07 VITALS
HEART RATE: 68 BPM | RESPIRATION RATE: 20 BRPM | OXYGEN SATURATION: 97 % | DIASTOLIC BLOOD PRESSURE: 90 MMHG | SYSTOLIC BLOOD PRESSURE: 164 MMHG | TEMPERATURE: 98 F

## 2023-07-07 DIAGNOSIS — Z98.890 OTHER SPECIFIED POSTPROCEDURAL STATES: Chronic | ICD-10-CM

## 2023-07-07 DIAGNOSIS — M81.0 AGE-RELATED OSTEOPOROSIS WITHOUT CURRENT PATHOLOGICAL FRACTURE: ICD-10-CM

## 2023-07-07 DIAGNOSIS — Z98.891 HISTORY OF UTERINE SCAR FROM PREVIOUS SURGERY: Chronic | ICD-10-CM

## 2023-07-07 PROCEDURE — 96365 THER/PROPH/DIAG IV INF INIT: CPT

## 2023-07-07 RX ORDER — ZOLEDRONIC ACID 5 MG/100ML
5 INJECTION, SOLUTION INTRAVENOUS ONCE
Refills: 0 | Status: COMPLETED | OUTPATIENT
Start: 2023-07-07 | End: 2023-07-07

## 2023-07-07 RX ADMIN — ZOLEDRONIC ACID 200 MILLIGRAM(S): 5 INJECTION, SOLUTION INTRAVENOUS at 10:07

## 2023-07-07 RX ADMIN — ZOLEDRONIC ACID 5 MILLIGRAM(S): 5 INJECTION, SOLUTION INTRAVENOUS at 10:38

## 2023-11-01 ENCOUNTER — NON-APPOINTMENT (OUTPATIENT)
Age: 63
End: 2023-11-01

## 2023-11-01 ENCOUNTER — APPOINTMENT (OUTPATIENT)
Dept: ENDOCRINOLOGY | Facility: CLINIC | Age: 63
End: 2023-11-01
Payer: COMMERCIAL

## 2023-11-01 VITALS
DIASTOLIC BLOOD PRESSURE: 92 MMHG | HEART RATE: 82 BPM | SYSTOLIC BLOOD PRESSURE: 176 MMHG | WEIGHT: 214 LBS | BODY MASS INDEX: 30.71 KG/M2

## 2023-11-01 LAB — GLUCOSE BLDC GLUCOMTR-MCNC: 79

## 2023-11-01 PROCEDURE — 82962 GLUCOSE BLOOD TEST: CPT

## 2023-11-01 PROCEDURE — 99214 OFFICE O/P EST MOD 30 MIN: CPT | Mod: 25

## 2023-11-01 RX ORDER — METFORMIN HYDROCHLORIDE 500 MG/1
500 TABLET, COATED ORAL TWICE DAILY
Refills: 0 | Status: ACTIVE | COMMUNITY
Start: 2022-02-04

## 2023-11-01 RX ORDER — DULAGLUTIDE 1.5 MG/.5ML
1.5 INJECTION, SOLUTION SUBCUTANEOUS
Qty: 3 | Refills: 2 | Status: ACTIVE | COMMUNITY
Start: 2023-02-08 | End: 1900-01-01

## 2023-11-02 LAB
25(OH)D3 SERPL-MCNC: 25.8 NG/ML
ALBUMIN SERPL ELPH-MCNC: 4.6 G/DL
ALP BLD-CCNC: 52 U/L
ALT SERPL-CCNC: 34 U/L
ANION GAP SERPL CALC-SCNC: 11 MMOL/L
AST SERPL-CCNC: 35 U/L
BASOPHILS # BLD AUTO: 0.02 K/UL
BASOPHILS NFR BLD AUTO: 0.4 %
BILIRUB SERPL-MCNC: 0.4 MG/DL
BUN SERPL-MCNC: 12 MG/DL
CALCIUM SERPL-MCNC: 9.5 MG/DL
CALCIUM SERPL-MCNC: 9.5 MG/DL
CHLORIDE SERPL-SCNC: 103 MMOL/L
CHOLEST SERPL-MCNC: 198 MG/DL
CO2 SERPL-SCNC: 26 MMOL/L
CREAT SERPL-MCNC: 0.69 MG/DL
EGFR: 98 ML/MIN/1.73M2
EOSINOPHIL # BLD AUTO: 0.1 K/UL
EOSINOPHIL NFR BLD AUTO: 2.1 %
ESTIMATED AVERAGE GLUCOSE: 134 MG/DL
GLUCOSE SERPL-MCNC: 79 MG/DL
HBA1C MFR BLD HPLC: 6.3 %
HCT VFR BLD CALC: 37.8 %
HDLC SERPL-MCNC: 88 MG/DL
HGB BLD-MCNC: 12.1 G/DL
IMM GRANULOCYTES NFR BLD AUTO: 0.2 %
LDLC SERPL CALC-MCNC: 95 MG/DL
LYMPHOCYTES # BLD AUTO: 1.89 K/UL
LYMPHOCYTES NFR BLD AUTO: 40.2 %
MAGNESIUM SERPL-MCNC: 1.9 MG/DL
MAN DIFF?: NORMAL
MCHC RBC-ENTMCNC: 29.1 PG
MCHC RBC-ENTMCNC: 32 GM/DL
MCV RBC AUTO: 90.9 FL
MONOCYTES # BLD AUTO: 0.52 K/UL
MONOCYTES NFR BLD AUTO: 11.1 %
NEUTROPHILS # BLD AUTO: 2.16 K/UL
NEUTROPHILS NFR BLD AUTO: 46 %
NONHDLC SERPL-MCNC: 110 MG/DL
PARATHYROID HORMONE INTACT: 47 PG/ML
PHOSPHATE SERPL-MCNC: 3.2 MG/DL
PLATELET # BLD AUTO: 189 K/UL
POTASSIUM SERPL-SCNC: 4.1 MMOL/L
PROT SERPL-MCNC: 7.8 G/DL
RBC # BLD: 4.16 M/UL
RBC # FLD: 14.2 %
SODIUM SERPL-SCNC: 140 MMOL/L
TRIGL SERPL-MCNC: 85 MG/DL
TSH SERPL-ACNC: 1.14 UIU/ML
VIT B12 SERPL-MCNC: 685 PG/ML
WBC # FLD AUTO: 4.7 K/UL

## 2023-11-03 LAB
CREAT SPEC-SCNC: 155 MG/DL
MICROALBUMIN 24H UR DL<=1MG/L-MCNC: 8.5 MG/DL
MICROALBUMIN/CREAT 24H UR-RTO: 55 MG/G

## 2023-12-07 ENCOUNTER — APPOINTMENT (OUTPATIENT)
Dept: OTOLARYNGOLOGY | Facility: CLINIC | Age: 63
End: 2023-12-07
Payer: COMMERCIAL

## 2023-12-07 VITALS
OXYGEN SATURATION: 99 % | WEIGHT: 214 LBS | DIASTOLIC BLOOD PRESSURE: 92 MMHG | BODY MASS INDEX: 30.64 KG/M2 | SYSTOLIC BLOOD PRESSURE: 171 MMHG | HEART RATE: 85 BPM | HEIGHT: 70 IN

## 2023-12-07 DIAGNOSIS — J04.0 ACUTE LARYNGITIS: ICD-10-CM

## 2023-12-07 DIAGNOSIS — E21.0 PRIMARY HYPERPARATHYROIDISM: ICD-10-CM

## 2023-12-07 DIAGNOSIS — K21.9 ACUTE LARYNGITIS: ICD-10-CM

## 2023-12-07 DIAGNOSIS — D35.1 BENIGN NEOPLASM OF PARATHYROID GLAND: ICD-10-CM

## 2023-12-07 DIAGNOSIS — E55.9 VITAMIN D DEFICIENCY, UNSPECIFIED: ICD-10-CM

## 2023-12-07 PROCEDURE — 99214 OFFICE O/P EST MOD 30 MIN: CPT | Mod: 25

## 2023-12-07 PROCEDURE — 31575 DIAGNOSTIC LARYNGOSCOPY: CPT

## 2024-04-18 ENCOUNTER — APPOINTMENT (OUTPATIENT)
Dept: ENDOCRINOLOGY | Facility: CLINIC | Age: 64
End: 2024-04-18
Payer: COMMERCIAL

## 2024-04-18 ENCOUNTER — NON-APPOINTMENT (OUTPATIENT)
Age: 64
End: 2024-04-18

## 2024-04-18 VITALS
HEART RATE: 81 BPM | BODY MASS INDEX: 30.35 KG/M2 | WEIGHT: 212 LBS | HEIGHT: 70 IN | DIASTOLIC BLOOD PRESSURE: 76 MMHG | SYSTOLIC BLOOD PRESSURE: 132 MMHG

## 2024-04-18 DIAGNOSIS — E11.9 TYPE 2 DIABETES MELLITUS W/OUT COMPLICATIONS: ICD-10-CM

## 2024-04-18 DIAGNOSIS — M81.0 AGE-RELATED OSTEOPOROSIS W/OUT CURRENT PATHOLOGICAL FRACTURE: ICD-10-CM

## 2024-04-18 DIAGNOSIS — E78.5 TYPE 2 DIABETES MELLITUS WITH OTHER SPECIFIED COMPLICATION: ICD-10-CM

## 2024-04-18 DIAGNOSIS — Z90.89 OTHER SPECIFIED POSTPROCEDURAL STATES: ICD-10-CM

## 2024-04-18 DIAGNOSIS — E11.69 TYPE 2 DIABETES MELLITUS WITH OTHER SPECIFIED COMPLICATION: ICD-10-CM

## 2024-04-18 DIAGNOSIS — Z98.890 OTHER SPECIFIED POSTPROCEDURAL STATES: ICD-10-CM

## 2024-04-18 LAB — GLUCOSE BLDC GLUCOMTR-MCNC: 86

## 2024-04-18 PROCEDURE — 82962 GLUCOSE BLOOD TEST: CPT

## 2024-04-18 PROCEDURE — 99214 OFFICE O/P EST MOD 30 MIN: CPT | Mod: 25

## 2024-04-18 RX ORDER — ATORVASTATIN CALCIUM 40 MG/1
40 TABLET, FILM COATED ORAL
Qty: 90 | Refills: 3 | Status: ACTIVE | COMMUNITY
Start: 1900-01-01 | End: 1900-01-01

## 2024-04-18 RX ORDER — TIZANIDINE 4 MG/1
4 TABLET ORAL
Qty: 40 | Refills: 0 | Status: DISCONTINUED | COMMUNITY
Start: 2022-09-06 | End: 2024-04-18

## 2024-04-18 RX ORDER — LOSARTAN POTASSIUM 25 MG/1
25 TABLET, FILM COATED ORAL
Qty: 90 | Refills: 2 | Status: ACTIVE | COMMUNITY
Start: 2024-04-18 | End: 1900-01-01

## 2024-04-18 NOTE — ASSESSMENT
[FreeTextEntry1] : Type 2 diabetes mellitus. HbA1c 6.5% in March 2024 and glucose 86 mg/dL today. No known history of micro- or macrovascular complications. I congratulated her on her history of excellent glycemic control. We discussed the cardiovascular and microvascular complications of uncontrolled diabetes. We discussed the importance of diet and exercise and lifestyle modification for glycemic control. We discussed pharmacologic options for glycemic control. She is tolerating her current regimen and we will continue. Continue metformin 1000 mg twice daily Continue Trulicity 1.5 mg weekly Start losartan 25 mg daily due to microalbuminuria; we reviewed the risks and benefits, including but not limited to angioedema and hyperkalemia She is not on a blood pressure regimen; blood pressure around goal She is on a statin for cholesterol; last lipid panel around goal Nephropathy screening: Elevated urine microalbumin; will start an angiotensin receptor blocker as above Last ophthalmology appointment: November 2024; every 6 months with upcoming appointment Last podiatry appointment: March 2024; every 3 months Last dental appointment: Over six months; advised appointment  Primary hyperparathyroidism status post parathyroidectomy. Osteoporosis. She was first noted to have mild hypercalcemia in 2016. She had hypercalcemia with elevated PTH concentrations consistent with primary hyperparathyroidism. She met criteria for parathyroidectomy due to osteoporosis. She is status post resection of an enlarged hypercellular right superior parathyroid gland in February 2022 weighing 0.380 g and measuring 1.5 cm in greatest diameter. She has had evidence of biochemical cure postoperatively. Her last bone density demonstrated persistent osteoporosis at the distal radius. She received zoledronic acid 5 mg IV in July 2023. We have discussed the potential benefits and risks of antiresorptive osteoporosis therapy, including but not limited to osteonecrosis of the jaw and atypical femoral fracture.  Zoledronic acid 5 mg IV, second dose due in July 2024 Calcium 3794-6986 mg daily from diet and supplements (to be taken in divided doses as no more than 500-600 mg can be absorbed at one time); continue current regimen Continue current vitamin D regimen pending level  Return to see me in 3 months.   CC: Dr. Luisana Feliz, Fax [phone]

## 2024-04-18 NOTE — DATA REVIEWED
[FreeTextEntry1] : Laboratories (March 19, 2024) reviewed and significant for:  Unremarkable complete blood count Calcium 9.3 mg/dL (albumin 4.6 g/dL) PTH 47 pg/mL BUN/creatinine 11/0.71 mg/dL (eGFR 96 mL/min) ALT 39 U/L (normal: 6-29), otherwise unremarkable comprehensive metabolic panel TSH 0.96 uIU/mL LDL 96 mg/dL HDL 76 mg/dL Total cholesterol 189 mg/dL Triglycerides 84 mg/dL Urine microalbumin 36 mcg/mg creatinine  Most recent bone mineral density Date: February 13, 2023 Source: [facility] Site: [facility]  Site	BMD	T-score	Change previous	Change baseline	 Lumbar spine	0.826	-2.3			 Femoral neck	0.705	-1.3			 Total hip          0.808	-1.1			 Distal radius	0.544	-2.5			 DXA comments: Baseline scan at this facility; L1 excluded; left hip values Vertebral fracture assessment without evidence of compression fractures T7-L5 (my read)  Impression: I have personally reviewed the DXA images and report. There is osteoporosis by the World Health Organization criteria. Both L1 and L4 were excluded on the report from [facility]; I agree with exclusion of L1, however, L4 is within one standard deviation of the other vertebrae and included above. Vertebral fracture assessment without evidence of compression fractures.

## 2024-04-18 NOTE — HISTORY OF PRESENT ILLNESS
[FreeTextEntry1] : Ms. Navarro is a 63 year-old woman with a history of type 2 diabetes mellitus, hyperlipidemia, primary hyperparathyroidism status post parathyroidectomy presenting for follow-up of her endocrine issues. I saw her for an initial visit in October 2019 and last in November 2023.   Primary hyperparathyroidism status post parathyroidectomy. She was first noted to have hypercalcemia in August 2016 with serum calcium 10.7 mg/dL (normal: 8.6-10.4; albumin 4.1 g/dL). Laboratory tests from September 2019 significant for serum calcium 10.9 mg/dL (normal: 8.6-10.4) with PTH 57 pg/mL (normal: 14-64). Renal function within range. Evaluation at the time of her initial visit confirmed primary hyperparathyroidism.  No history of nephrolithiasis. Renal imaging in November 2019 without evidence of nephrolithiasis. No history of fracture. Bone density from May 2021 significant for T-scores of -1.9 at the lumbar spine, -1.2 at the femoral neck, -0.9 at the total hip, -2.6 at the 1/3 radius. Vertebral fracture assessment without evidence of compression fractures. Most recent bone density from February 2023 significant for T-scores of -2.3 at the lumbar spine (L1-L4), -1.3 at the femoral neck, -1.1 at the total hip, and -2.5 at the distal radius. She received zoledronic acid 5 mg IV in July 2023 and tolerated well.  She is status post resection of an enlarged hypercellular right superior parathyroid gland in February 2022 weighing 0.380 g and measuring 1.5 cm in greatest diameter. She has had evidence of biochemical cure postoperatively. She has 1 serving of dairy per day (milk, cheese, or yogurt). She is taking Caltrate 600 mg daily. She has been taking vitamin D 4000 intl units daily. Mother and son with history of diabetes. No family history of calcium or other endocrine disorders.   Type 2 diabetes mellitus. HbA1c 6.5% in March 2024 and glucose 86 mg/dL today; 6.3% in November 2023, 7.8% in February 2023, 6.7% in March 2022, 7.5% in December 2021. No known history of micro- or macrovascular complications. She was diagnosed with diabetes in 2017. No hospitalizations for hypo- or hyperglycemia. At her initial visit she was taking metformin 500 mg twice daily. In May 2021 we adjusted metformin to 1000 mg twice daily. In December 2021 we continued metformin 500 mg twice daily and started Januvia 100 mg daily. In February 2023 we continued metformin 500 mg twice daily, stopped Januvia, and started Trulicity up to 1.5 mg weekly. She is currently taking metformin 500 mg twice daily and Trulicity 1.5 mg weekly. She is not on a blood pressure regimen She is on a statin for cholesterol Nephropathy screening: Elevated urine microalbumin; will reassess with improvement in glycemic control Last ophthalmology appointment: November 2024; every 6 months with upcoming appointment Last podiatry appointment: March 2024; every 3 months Last dental appointment: Over six months; advised appointment  Interim History  She is currently taking metformin 500 mg twice daily and Trulicity 1.5 mg weekly. She had been off Trulicity for three months due to an issue with the prior authorization.  Laboratory results from last visit as below. HbA1c 6.3%. LDL 95 mg/dL; we recommended adjustment in atorvastatin for goal LDL around 70 mg/dL. Urine microalbumin remained elevated. Calciotropic panel and other test results within range; 25-hydroxyvitamin D within the standard reference range of 20-50 ng/mL.  She has been taking atorvastatin 20 mg prescribed by her primary care provider.  Recent laboratory results ordered by her primary care provider significant for the below; see scanned reports.  No chest pain, shortness of breath, polyuria/polydipsia, lower extremity numbness/tingling.  She will be going on a [employer] cruise for her sister's birthday and again for her own birthday. Medical and surgical history, medications, allergies, social and family history reviewed and updated as needed.

## 2024-04-18 NOTE — PHYSICAL EXAM
[Alert] : alert [Healthy Appearance] : healthy appearance [No Acute Distress] : no acute distress [Normal Sclera/Conjunctiva] : normal sclera/conjunctiva [Normal Hearing] : hearing was normal [Well Healed Scar] : well healed scar [No Respiratory Distress] : no respiratory distress [No Stigmata of Cushings Syndrome] : no stigmata of Cushings Syndrome [Normal Gait] : normal gait [Normal Insight/Judgement] : insight and judgment were intact [Kyphosis] : no kyphosis present [Acanthosis Nigricans] : no acanthosis nigricans [de-identified] : no moon facies, no supraclavicular fat pads

## 2024-07-01 ENCOUNTER — APPOINTMENT (OUTPATIENT)
Dept: BREAST CENTER | Facility: CLINIC | Age: 64
End: 2024-07-01
Payer: COMMERCIAL

## 2024-07-01 VITALS
HEIGHT: 70 IN | DIASTOLIC BLOOD PRESSURE: 83 MMHG | HEART RATE: 63 BPM | SYSTOLIC BLOOD PRESSURE: 151 MMHG | WEIGHT: 196 LBS | BODY MASS INDEX: 28.06 KG/M2

## 2024-07-01 DIAGNOSIS — Z12.39 ENCOUNTER FOR OTHER SCREENING FOR MALIGNANT NEOPLASM OF BREAST: ICD-10-CM

## 2024-07-01 DIAGNOSIS — R92.8 OTHER ABNORMAL AND INCONCLUSIVE FINDINGS ON DIAGNOSTIC IMAGING OF BREAST: ICD-10-CM

## 2024-07-01 DIAGNOSIS — D44.0 NEOPLASM OF UNCERTAIN BEHAVIOR OF THYROID GLAND: ICD-10-CM

## 2024-07-01 PROCEDURE — 99213 OFFICE O/P EST LOW 20 MIN: CPT

## 2024-08-01 ENCOUNTER — NON-APPOINTMENT (OUTPATIENT)
Age: 64
End: 2024-08-01

## 2024-08-01 ENCOUNTER — APPOINTMENT (OUTPATIENT)
Dept: ENDOCRINOLOGY | Facility: CLINIC | Age: 64
End: 2024-08-01
Payer: COMMERCIAL

## 2024-08-01 VITALS
BODY MASS INDEX: 27.12 KG/M2 | DIASTOLIC BLOOD PRESSURE: 76 MMHG | HEART RATE: 80 BPM | SYSTOLIC BLOOD PRESSURE: 150 MMHG | WEIGHT: 189 LBS

## 2024-08-01 DIAGNOSIS — Z98.890 OTHER SPECIFIED POSTPROCEDURAL STATES: ICD-10-CM

## 2024-08-01 DIAGNOSIS — E55.9 VITAMIN D DEFICIENCY, UNSPECIFIED: ICD-10-CM

## 2024-08-01 DIAGNOSIS — M81.0 AGE-RELATED OSTEOPOROSIS W/OUT CURRENT PATHOLOGICAL FRACTURE: ICD-10-CM

## 2024-08-01 DIAGNOSIS — E11.9 TYPE 2 DIABETES MELLITUS W/OUT COMPLICATIONS: ICD-10-CM

## 2024-08-01 DIAGNOSIS — E11.69 TYPE 2 DIABETES MELLITUS WITH OTHER SPECIFIED COMPLICATION: ICD-10-CM

## 2024-08-01 DIAGNOSIS — Z90.89 OTHER SPECIFIED POSTPROCEDURAL STATES: ICD-10-CM

## 2024-08-01 DIAGNOSIS — E78.5 TYPE 2 DIABETES MELLITUS WITH OTHER SPECIFIED COMPLICATION: ICD-10-CM

## 2024-08-01 LAB
GLUCOSE BLDC GLUCOMTR-MCNC: 83
HBA1C MFR BLD HPLC: 5.9

## 2024-08-01 PROCEDURE — 36415 COLL VENOUS BLD VENIPUNCTURE: CPT

## 2024-08-01 PROCEDURE — G2211 COMPLEX E/M VISIT ADD ON: CPT | Mod: NC

## 2024-08-01 PROCEDURE — 83036 HEMOGLOBIN GLYCOSYLATED A1C: CPT | Mod: QW

## 2024-08-01 PROCEDURE — 82962 GLUCOSE BLOOD TEST: CPT

## 2024-08-01 PROCEDURE — 99214 OFFICE O/P EST MOD 30 MIN: CPT

## 2024-08-02 LAB
25(OH)D3 SERPL-MCNC: 26.4 NG/ML
ALBUMIN SERPL ELPH-MCNC: 4.7 G/DL
ALP BLD-CCNC: 61 U/L
ALT SERPL-CCNC: 31 U/L
ANION GAP SERPL CALC-SCNC: 12 MMOL/L
AST SERPL-CCNC: 30 U/L
BILIRUB SERPL-MCNC: 0.3 MG/DL
BUN SERPL-MCNC: 13 MG/DL
CALCIUM SERPL-MCNC: 9.9 MG/DL
CALCIUM SERPL-MCNC: 9.9 MG/DL
CHLORIDE SERPL-SCNC: 106 MMOL/L
CHOLEST SERPL-MCNC: 218 MG/DL
CO2 SERPL-SCNC: 25 MMOL/L
CREAT SERPL-MCNC: 0.91 MG/DL
CREAT SPEC-SCNC: 297 MG/DL
EGFR: 71 ML/MIN/1.73M2
GLUCOSE SERPL-MCNC: 82 MG/DL
HDLC SERPL-MCNC: 66 MG/DL
LDLC SERPL CALC-MCNC: 125 MG/DL
MAGNESIUM SERPL-MCNC: 2.1 MG/DL
MICROALBUMIN 24H UR DL<=1MG/L-MCNC: 5.8 MG/DL
MICROALBUMIN/CREAT 24H UR-RTO: 20 MG/G
NONHDLC SERPL-MCNC: 152 MG/DL
PARATHYROID HORMONE INTACT: 39 PG/ML
PHOSPHATE SERPL-MCNC: 3.9 MG/DL
POTASSIUM SERPL-SCNC: 4.2 MMOL/L
PROT SERPL-MCNC: 8 G/DL
SODIUM SERPL-SCNC: 143 MMOL/L
TRIGL SERPL-MCNC: 160 MG/DL
VIT B12 SERPL-MCNC: 607 PG/ML

## 2024-08-06 LAB — ALP BONE SERPL-MCNC: 10.6 UG/L

## 2024-08-06 NOTE — ASSESSMENT
[FreeTextEntry1] : Type 2 diabetes mellitus. Point-of-care HbA1c 5.9% and glucose 83 mg/dL today. No known history of micro- or macrovascular complications. I congratulated her on her history of excellent glycemic control. We discussed the cardiovascular and microvascular complications of uncontrolled diabetes. We discussed the importance of diet and exercise and lifestyle modification for glycemic control. We discussed pharmacologic options for glycemic control. She is tolerating her current regimen and we will continue. Continue metformin 500 mg twice daily Continue Trulicity 1.5 mg weekly She is on a blood pressure regimen; blood pressure elevated today but has been around goal and will monitor She is on a statin for cholesterol; last lipid panel around goal Nephropathy screening: Treated with an angiotensin receptor blocker Last ophthalmology appointment: May 2024; every 6 months with upcoming appointment Last podiatry appointment: July 2024; every 3 months Last dental appointment: Over six months; advised appointment  Primary hyperparathyroidism status post parathyroidectomy. Osteoporosis. She was first noted to have mild hypercalcemia in 2016. She had hypercalcemia with elevated PTH concentrations consistent with primary hyperparathyroidism. She met criteria for parathyroidectomy due to osteoporosis. She is status post resection of an enlarged hypercellular right superior parathyroid gland in February 2022 weighing 0.380 g and measuring 1.5 cm in greatest diameter. She has had evidence of biochemical cure postoperatively. Her last bone density demonstrated persistent osteoporosis at the distal radius. She received zoledronic acid 5 mg IV in July 2023. We have discussed the potential benefits and risks of antiresorptive osteoporosis therapy, including but not limited to osteonecrosis of the jaw and atypical femoral fracture.  Zoledronic acid 5 mg IV, second dose due pending insurance authorization Calcium 9437-2905 mg daily from diet and supplements (to be taken in divided doses as no more than 500-600 mg can be absorbed at one time); continue current regimen Continue current vitamin D regimen pending level  Return to see me in 6 months or earlier as needed.   CC: Dr. Luisana Feliz, Fax [phone]

## 2024-08-06 NOTE — PHYSICAL EXAM
[Alert] : alert [Healthy Appearance] : healthy appearance [No Acute Distress] : no acute distress [Normal Sclera/Conjunctiva] : normal sclera/conjunctiva [Normal Hearing] : hearing was normal [Well Healed Scar] : well healed scar [No Respiratory Distress] : no respiratory distress [No Stigmata of Cushings Syndrome] : no stigmata of Cushings Syndrome [Normal Gait] : normal gait [Normal Insight/Judgement] : insight and judgment were intact [Kyphosis] : no kyphosis present [Acanthosis Nigricans] : no acanthosis nigricans [de-identified] : no moon facies, no supraclavicular fat pads

## 2024-08-06 NOTE — HISTORY OF PRESENT ILLNESS
[FreeTextEntry1] : Ms. Navarro is a 63 year-old woman with a history of type 2 diabetes mellitus, hyperlipidemia, primary hyperparathyroidism status post parathyroidectomy presenting for follow-up of her endocrine issues. I saw her for an initial visit in October 2019 and last in April 2024.  Primary hyperparathyroidism status post parathyroidectomy. She was first noted to have hypercalcemia in August 2016 with serum calcium 10.7 mg/dL (normal: 8.6-10.4; albumin 4.1 g/dL). Laboratory tests from September 2019 significant for serum calcium 10.9 mg/dL (normal: 8.6-10.4) with PTH 57 pg/mL (normal: 14-64). Renal function within range. Evaluation at the time of her initial visit confirmed primary hyperparathyroidism.  No history of nephrolithiasis. Renal imaging in November 2019 without evidence of nephrolithiasis. No history of fracture. Bone density from May 2021 significant for T-scores of -1.9 at the lumbar spine, -1.2 at the femoral neck, -0.9 at the total hip, -2.6 at the 1/3 radius. Vertebral fracture assessment without evidence of compression fractures. Most recent bone density from February 2023 significant for T-scores of -2.3 at the lumbar spine (L1-L4), -1.3 at the femoral neck, -1.1 at the total hip, and -2.5 at the distal radius. She received zoledronic acid 5 mg IV in July 2023 and tolerated well.  She is status post resection of an enlarged hypercellular right superior parathyroid gland in February 2022 weighing 0.380 g and measuring 1.5 cm in greatest diameter. She has had evidence of biochemical cure postoperatively. She has 1 serving of dairy per day (milk, cheese, or yogurt). She is taking Caltrate 600 mg daily. She has been taking vitamin D 4000 intl units daily. Mother and son with history of diabetes. No family history of calcium or other endocrine disorders.   Type 2 diabetes mellitus. Point-of-care HbA1c 5.9% and glucose 83 mg/dL today; 6.5% in March 2024, 6.3% in November 2023, 7.8% in February 2023, 6.7% in March 2022, 7.5% in December 2021. No known history of micro- or macrovascular complications. She was diagnosed with diabetes in 2017. No hospitalizations for hypo- or hyperglycemia. At her initial visit she was taking metformin 500 mg twice daily. In May 2021 we adjusted metformin to 1000 mg twice daily. In December 2021 we continued metformin 500 mg twice daily and started Januvia 100 mg daily. In February 2023 we continued metformin 500 mg twice daily, stopped Januvia, and started Trulicity up to 1.5 mg weekly. She is currently taking metformin 500 mg twice daily and Trulicity 1.5 mg weekly. She is not on a blood pressure regimen She is on a statin for cholesterol Nephropathy screening: Elevated urine microalbumin; will reassess with improvement in glycemic control Last ophthalmology appointment: May 2024; every 6 months with upcoming appointment Last podiatry appointment: July 2024; every 3 months Last dental appointment: Over six months  Interim History  She is currently taking metformin 500 mg twice daily and Trulicity 1.5 mg weekly.  Last visit we started losartan for elevated urine microalbumin.  Weight is down 7 pounds since last visit. No chest pain, shortness of breath, polyuria/polydipsia, lower extremity numbness/tingling. She will be going on a [employer] for her birthday. Medical and surgical history, medications, allergies, social and family history reviewed and updated as needed.

## 2024-08-06 NOTE — ADDENDUM
[FreeTextEntry1] : Recent laboratory results as below; discussed with Ms. Navarro. 25-hydroxyvitamin D below goal and recommended adjustment in vitamin D to 5000 intl units daily. Calciotropic panel otherwise within range. We can proceed with zoledronic acid infusion pending insurance authorization. Lipid panel not at goal and recommended adjustment in atorvastatin to 80 mg daily. Urine microalbumin within range. 8/02/24

## 2024-08-06 NOTE — DATA REVIEWED
[FreeTextEntry1] : Laboratories (March 19, 2024) reviewed and significant for:  Unremarkable complete blood count Calcium 9.3 mg/dL (albumin 4.6 g/dL) PTH 47 pg/mL BUN/creatinine 11/0.71 mg/dL (eGFR 96 mL/min) ALT 39 U/L (normal: 6-29), otherwise unremarkable comprehensive metabolic panel TSH 0.96 uIU/mL LDL 96 mg/dL HDL 76 mg/dL Total cholesterol 189 mg/dL Triglycerides 84 mg/dL Urine microalbumin 36 mcg/mg creatinine  Most recent bone mineral density Date: February 13, 2023 Source: [facility] Site: [facility]  Site	BMD	T-score	Change previous	Change baseline	 Lumbar spine	0.826	-2.3			 Femoral neck	0.705	-1.3			 Total hip          0.808	-1.1			 Distal radius	0.544	-2.5			 DXA comments: Baseline scan at this facility; L1 excluded; left hip values Vertebral fracture assessment without evidence of compression fractures T7-L5 (my read)  Impression: I have personally reviewed the DXA images and report. There is osteoporosis by the World Health Organization criteria. Both L1 and L4 were excluded on the report from [facility]; I agree with exclusion of L1, however, L4 is within one standard deviation of the other vertebrae and included above. Vertebral fracture assessment without evidence of compression fractures.

## 2024-08-06 NOTE — PHYSICAL EXAM
[Alert] : alert [Healthy Appearance] : healthy appearance [No Acute Distress] : no acute distress [Normal Sclera/Conjunctiva] : normal sclera/conjunctiva [Normal Hearing] : hearing was normal [Well Healed Scar] : well healed scar [No Respiratory Distress] : no respiratory distress [No Stigmata of Cushings Syndrome] : no stigmata of Cushings Syndrome [Normal Gait] : normal gait [Normal Insight/Judgement] : insight and judgment were intact [Kyphosis] : no kyphosis present [Acanthosis Nigricans] : no acanthosis nigricans [de-identified] : no moon facies, no supraclavicular fat pads

## 2024-08-06 NOTE — DATA REVIEWED
[FreeTextEntry1] : Laboratories (March 19, 2024) reviewed and significant for:  Unremarkable complete blood count Calcium 9.3 mg/dL (albumin 4.6 g/dL) PTH 47 pg/mL BUN/creatinine 11/0.71 mg/dL (eGFR 96 mL/min) ALT 39 U/L (normal: 6-29), otherwise unremarkable comprehensive metabolic panel TSH 0.96 uIU/mL LDL 96 mg/dL HDL 76 mg/dL Total cholesterol 189 mg/dL Triglycerides 84 mg/dL Urine microalbumin 36 mcg/mg creatinine  Most recent bone mineral density Date: February 13, 2023 Source: [employer] Site: [facility]  Site	BMD	T-score	Change previous	Change baseline	 Lumbar spine	0.826	-2.3			 Femoral neck	0.705	-1.3			 Total hip          0.808	-1.1			 Distal radius	0.544	-2.5			 DXA comments: Baseline scan at this facility; L1 excluded; left hip values Vertebral fracture assessment without evidence of compression fractures T7-L5 (my read)  Impression: I have personally reviewed the DXA images and report. There is osteoporosis by the World Health Organization criteria. Both L1 and L4 were excluded on the report from [facility]; I agree with exclusion of L1, however, L4 is within one standard deviation of the other vertebrae and included above. Vertebral fracture assessment without evidence of compression fractures.

## 2024-08-06 NOTE — ASSESSMENT
[FreeTextEntry1] : Type 2 diabetes mellitus. Point-of-care HbA1c 5.9% and glucose 83 mg/dL today. No known history of micro- or macrovascular complications. I congratulated her on her history of excellent glycemic control. We discussed the cardiovascular and microvascular complications of uncontrolled diabetes. We discussed the importance of diet and exercise and lifestyle modification for glycemic control. We discussed pharmacologic options for glycemic control. She is tolerating her current regimen and we will continue. Continue metformin 500 mg twice daily Continue Trulicity 1.5 mg weekly She is on a blood pressure regimen; blood pressure elevated today but has been around goal and will monitor She is on a statin for cholesterol; last lipid panel around goal Nephropathy screening: Treated with an angiotensin receptor blocker Last ophthalmology appointment: May 2024; every 6 months with upcoming appointment Last podiatry appointment: July 2024; every 3 months Last dental appointment: Over six months; advised appointment  Primary hyperparathyroidism status post parathyroidectomy. Osteoporosis. She was first noted to have mild hypercalcemia in 2016. She had hypercalcemia with elevated PTH concentrations consistent with primary hyperparathyroidism. She met criteria for parathyroidectomy due to osteoporosis. She is status post resection of an enlarged hypercellular right superior parathyroid gland in February 2022 weighing 0.380 g and measuring 1.5 cm in greatest diameter. She has had evidence of biochemical cure postoperatively. Her last bone density demonstrated persistent osteoporosis at the distal radius. She received zoledronic acid 5 mg IV in July 2023. We have discussed the potential benefits and risks of antiresorptive osteoporosis therapy, including but not limited to osteonecrosis of the jaw and atypical femoral fracture.  Zoledronic acid 5 mg IV, second dose due pending insurance authorization Calcium 3744-9524 mg daily from diet and supplements (to be taken in divided doses as no more than 500-600 mg can be absorbed at one time); continue current regimen Continue current vitamin D regimen pending level  Return to see me in 6 months or earlier as needed.   CC: Dr. Luisana Feliz, Fax [phone]

## 2024-08-06 NOTE — HISTORY OF PRESENT ILLNESS
[FreeTextEntry1] : Ms. Navarro is a 63 year-old woman with a history of type 2 diabetes mellitus, hyperlipidemia, primary hyperparathyroidism status post parathyroidectomy presenting for follow-up of her endocrine issues. I saw her for an initial visit in October 2019 and last in April 2024.  Primary hyperparathyroidism status post parathyroidectomy. She was first noted to have hypercalcemia in August 2016 with serum calcium 10.7 mg/dL (normal: 8.6-10.4; albumin 4.1 g/dL). Laboratory tests from September 2019 significant for serum calcium 10.9 mg/dL (normal: 8.6-10.4) with PTH 57 pg/mL (normal: 14-64). Renal function within range. Evaluation at the time of her initial visit confirmed primary hyperparathyroidism.  No history of nephrolithiasis. Renal imaging in November 2019 without evidence of nephrolithiasis. No history of fracture. Bone density from May 2021 significant for T-scores of -1.9 at the lumbar spine, -1.2 at the femoral neck, -0.9 at the total hip, -2.6 at the 1/3 radius. Vertebral fracture assessment without evidence of compression fractures. Most recent bone density from February 2023 significant for T-scores of -2.3 at the lumbar spine (L1-L4), -1.3 at the femoral neck, -1.1 at the total hip, and -2.5 at the distal radius. She received zoledronic acid 5 mg IV in July 2023 and tolerated well.  She is status post resection of an enlarged hypercellular right superior parathyroid gland in February 2022 weighing 0.380 g and measuring 1.5 cm in greatest diameter. She has had evidence of biochemical cure postoperatively. She has 1 serving of dairy per day (milk, cheese, or yogurt). She is taking Caltrate 600 mg daily. She has been taking vitamin D 4000 intl units daily. Mother and son with history of diabetes. No family history of calcium or other endocrine disorders.   Type 2 diabetes mellitus. Point-of-care HbA1c 5.9% and glucose 83 mg/dL today; 6.5% in March 2024, 6.3% in November 2023, 7.8% in February 2023, 6.7% in March 2022, 7.5% in December 2021. No known history of micro- or macrovascular complications. She was diagnosed with diabetes in 2017. No hospitalizations for hypo- or hyperglycemia. At her initial visit she was taking metformin 500 mg twice daily. In May 2021 we adjusted metformin to 1000 mg twice daily. In December 2021 we continued metformin 500 mg twice daily and started Januvia 100 mg daily. In February 2023 we continued metformin 500 mg twice daily, stopped Januvia, and started Trulicity up to 1.5 mg weekly. She is currently taking metformin 500 mg twice daily and Trulicity 1.5 mg weekly. She is not on a blood pressure regimen She is on a statin for cholesterol Nephropathy screening: Elevated urine microalbumin; will reassess with improvement in glycemic control Last ophthalmology appointment: May 2024; every 6 months with upcoming appointment Last podiatry appointment: July 2024; every 3 months Last dental appointment: Over six months  Interim History  She is currently taking metformin 500 mg twice daily and Trulicity 1.5 mg weekly.  Last visit we started losartan for elevated urine microalbumin.  Weight is down 7 pounds since last visit. No chest pain, shortness of breath, polyuria/polydipsia, lower extremity numbness/tingling. She will be going on a [employer] for her birthday. Medical and surgical history, medications, allergies, social and family history reviewed and updated as needed.

## 2024-08-06 NOTE — HISTORY OF PRESENT ILLNESS
[FreeTextEntry1] : Ms. Navarro is a 63 year-old woman with a history of type 2 diabetes mellitus, hyperlipidemia, primary hyperparathyroidism status post parathyroidectomy presenting for follow-up of her endocrine issues. I saw her for an initial visit in October 2019 and last in April 2024.  Primary hyperparathyroidism status post parathyroidectomy. She was first noted to have hypercalcemia in August 2016 with serum calcium 10.7 mg/dL (normal: 8.6-10.4; albumin 4.1 g/dL). Laboratory tests from September 2019 significant for serum calcium 10.9 mg/dL (normal: 8.6-10.4) with PTH 57 pg/mL (normal: 14-64). Renal function within range. Evaluation at the time of her initial visit confirmed primary hyperparathyroidism.  No history of nephrolithiasis. Renal imaging in November 2019 without evidence of nephrolithiasis. No history of fracture. Bone density from May 2021 significant for T-scores of -1.9 at the lumbar spine, -1.2 at the femoral neck, -0.9 at the total hip, -2.6 at the 1/3 radius. Vertebral fracture assessment without evidence of compression fractures. Most recent bone density from February 2023 significant for T-scores of -2.3 at the lumbar spine (L1-L4), -1.3 at the femoral neck, -1.1 at the total hip, and -2.5 at the distal radius. She received zoledronic acid 5 mg IV in July 2023 and tolerated well.  She is status post resection of an enlarged hypercellular right superior parathyroid gland in February 2022 weighing 0.380 g and measuring 1.5 cm in greatest diameter. She has had evidence of biochemical cure postoperatively. She has 1 serving of dairy per day (milk, cheese, or yogurt). She is taking Caltrate 600 mg daily. She has been taking vitamin D 4000 intl units daily. Mother and son with history of diabetes. No family history of calcium or other endocrine disorders.   Type 2 diabetes mellitus. Point-of-care HbA1c 5.9% and glucose 83 mg/dL today; 6.5% in March 2024, 6.3% in November 2023, 7.8% in February 2023, 6.7% in March 2022, 7.5% in December 2021. No known history of micro- or macrovascular complications. She was diagnosed with diabetes in 2017. No hospitalizations for hypo- or hyperglycemia. At her initial visit she was taking metformin 500 mg twice daily. In May 2021 we adjusted metformin to 1000 mg twice daily. In December 2021 we continued metformin 500 mg twice daily and started Januvia 100 mg daily. In February 2023 we continued metformin 500 mg twice daily, stopped Januvia, and started Trulicity up to 1.5 mg weekly. She is currently taking metformin 500 mg twice daily and Trulicity 1.5 mg weekly. She is not on a blood pressure regimen She is on a statin for cholesterol Nephropathy screening: Elevated urine microalbumin; will reassess with improvement in glycemic control Last ophthalmology appointment: May 2024; every 6 months with upcoming appointment Last podiatry appointment: July 2024; every 3 months Last dental appointment: Over six months  Interim History  She is currently taking metformin 500 mg twice daily and Trulicity 1.5 mg weekly.  Last visit we started losartan for elevated urine microalbumin.  Weight is down 7 pounds since last visit. No chest pain, shortness of breath, polyuria/polydipsia, lower extremity numbness/tingling. She will be going on a [employer] for her birthday. Medical and surgical history, medications, allergies, social and family history reviewed and updated as needed.

## 2024-08-06 NOTE — ASSESSMENT
[FreeTextEntry1] : Type 2 diabetes mellitus. Point-of-care HbA1c 5.9% and glucose 83 mg/dL today. No known history of micro- or macrovascular complications. I congratulated her on her history of excellent glycemic control. We discussed the cardiovascular and microvascular complications of uncontrolled diabetes. We discussed the importance of diet and exercise and lifestyle modification for glycemic control. We discussed pharmacologic options for glycemic control. She is tolerating her current regimen and we will continue. Continue metformin 500 mg twice daily Continue Trulicity 1.5 mg weekly She is on a blood pressure regimen; blood pressure elevated today but has been around goal and will monitor She is on a statin for cholesterol; last lipid panel around goal Nephropathy screening: Treated with an angiotensin receptor blocker Last ophthalmology appointment: May 2024; every 6 months with upcoming appointment Last podiatry appointment: July 2024; every 3 months Last dental appointment: Over six months; advised appointment  Primary hyperparathyroidism status post parathyroidectomy. Osteoporosis. She was first noted to have mild hypercalcemia in 2016. She had hypercalcemia with elevated PTH concentrations consistent with primary hyperparathyroidism. She met criteria for parathyroidectomy due to osteoporosis. She is status post resection of an enlarged hypercellular right superior parathyroid gland in February 2022 weighing 0.380 g and measuring 1.5 cm in greatest diameter. She has had evidence of biochemical cure postoperatively. Her last bone density demonstrated persistent osteoporosis at the distal radius. She received zoledronic acid 5 mg IV in July 2023. We have discussed the potential benefits and risks of antiresorptive osteoporosis therapy, including but not limited to osteonecrosis of the jaw and atypical femoral fracture.  Zoledronic acid 5 mg IV, second dose due pending insurance authorization Calcium 4587-9113 mg daily from diet and supplements (to be taken in divided doses as no more than 500-600 mg can be absorbed at one time); continue current regimen Continue current vitamin D regimen pending level  Return to see me in 6 months or earlier as needed.   CC: Dr. Luisana Feliz, Fax [phone]

## 2024-08-06 NOTE — PHYSICAL EXAM
[Alert] : alert [Healthy Appearance] : healthy appearance [No Acute Distress] : no acute distress [Normal Sclera/Conjunctiva] : normal sclera/conjunctiva [Normal Hearing] : hearing was normal [Well Healed Scar] : well healed scar [No Respiratory Distress] : no respiratory distress [No Stigmata of Cushings Syndrome] : no stigmata of Cushings Syndrome [Normal Gait] : normal gait [Normal Insight/Judgement] : insight and judgment were intact [Kyphosis] : no kyphosis present [Acanthosis Nigricans] : no acanthosis nigricans [de-identified] : no moon facies, no supraclavicular fat pads

## 2024-08-06 NOTE — DATA REVIEWED
[FreeTextEntry1] : Laboratories (March 19, 2024) reviewed and significant for:  Unremarkable complete blood count Calcium 9.3 mg/dL (albumin 4.6 g/dL) PTH 47 pg/mL BUN/creatinine 11/0.71 mg/dL (eGFR 96 mL/min) ALT 39 U/L (normal: 6-29), otherwise unremarkable comprehensive metabolic panel TSH 0.96 uIU/mL LDL 96 mg/dL HDL 76 mg/dL Total cholesterol 189 mg/dL Triglycerides 84 mg/dL Urine microalbumin 36 mcg/mg creatinine  Most recent bone mineral density Date: February 13, 2023 Source: [facility] Site: [facility]  Site	BMD	T-score	Change previous	Change baseline	 Lumbar spine	0.826	-2.3			 Femoral neck	0.705	-1.3			 Total hip          0.808	-1.1			 Distal radius	0.544	-2.5			 DXA comments: Baseline scan at this facility; L1 excluded; left hip values Vertebral fracture assessment without evidence of compression fractures T7-L5 (my read)  Impression: I have personally reviewed the DXA images and report. There is osteoporosis by the World Health Organization criteria. Both L1 and L4 were excluded on the report from [facility]; I agree with exclusion of L1, however, L4 is within one standard deviation of the other vertebrae and included above. Vertebral fracture assessment without evidence of compression fractures.

## 2024-08-07 ENCOUNTER — NON-APPOINTMENT (OUTPATIENT)
Age: 64
End: 2024-08-07

## 2024-09-17 ENCOUNTER — OUTPATIENT (OUTPATIENT)
Dept: OUTPATIENT SERVICES | Facility: HOSPITAL | Age: 64
LOS: 1 days | End: 2024-09-17
Payer: COMMERCIAL

## 2024-09-17 ENCOUNTER — APPOINTMENT (OUTPATIENT)
Dept: INFUSION THERAPY | Facility: CLINIC | Age: 64
End: 2024-09-17

## 2024-09-17 VITALS
HEIGHT: 70 IN | DIASTOLIC BLOOD PRESSURE: 82 MMHG | TEMPERATURE: 97 F | OXYGEN SATURATION: 98 % | WEIGHT: 184.97 LBS | HEART RATE: 74 BPM | RESPIRATION RATE: 18 BRPM | SYSTOLIC BLOOD PRESSURE: 168 MMHG

## 2024-09-17 DIAGNOSIS — Z98.890 OTHER SPECIFIED POSTPROCEDURAL STATES: Chronic | ICD-10-CM

## 2024-09-17 DIAGNOSIS — M81.0 AGE-RELATED OSTEOPOROSIS WITHOUT CURRENT PATHOLOGICAL FRACTURE: ICD-10-CM

## 2024-09-17 DIAGNOSIS — Z98.891 HISTORY OF UTERINE SCAR FROM PREVIOUS SURGERY: Chronic | ICD-10-CM

## 2024-09-17 PROCEDURE — 96365 THER/PROPH/DIAG IV INF INIT: CPT

## 2024-09-17 RX ORDER — ZOLEDRONIC ACID 0.04 MG/ML
5 INJECTION, SOLUTION INTRAVENOUS ONCE
Refills: 0 | Status: COMPLETED | OUTPATIENT
Start: 2024-09-17 | End: 2024-09-17

## 2024-09-17 RX ADMIN — ZOLEDRONIC ACID 200 MILLIGRAM(S): 0.04 INJECTION, SOLUTION INTRAVENOUS at 17:07

## 2024-09-17 RX ADMIN — ZOLEDRONIC ACID 5 MILLIGRAM(S): 0.04 INJECTION, SOLUTION INTRAVENOUS at 17:38

## 2025-01-27 ENCOUNTER — NON-APPOINTMENT (OUTPATIENT)
Age: 65
End: 2025-01-27

## 2025-02-11 ENCOUNTER — APPOINTMENT (OUTPATIENT)
Dept: ENDOCRINOLOGY | Facility: CLINIC | Age: 65
End: 2025-02-11
Payer: COMMERCIAL

## 2025-02-11 VITALS
WEIGHT: 176 LBS | HEART RATE: 81 BPM | SYSTOLIC BLOOD PRESSURE: 145 MMHG | DIASTOLIC BLOOD PRESSURE: 74 MMHG | BODY MASS INDEX: 25.25 KG/M2

## 2025-02-11 DIAGNOSIS — Z90.89 OTHER SPECIFIED POSTPROCEDURAL STATES: ICD-10-CM

## 2025-02-11 DIAGNOSIS — E11.69 TYPE 2 DIABETES MELLITUS WITH OTHER SPECIFIED COMPLICATION: ICD-10-CM

## 2025-02-11 DIAGNOSIS — M81.0 AGE-RELATED OSTEOPOROSIS W/OUT CURRENT PATHOLOGICAL FRACTURE: ICD-10-CM

## 2025-02-11 DIAGNOSIS — Z98.890 OTHER SPECIFIED POSTPROCEDURAL STATES: ICD-10-CM

## 2025-02-11 DIAGNOSIS — E55.9 VITAMIN D DEFICIENCY, UNSPECIFIED: ICD-10-CM

## 2025-02-11 DIAGNOSIS — E11.9 TYPE 2 DIABETES MELLITUS W/OUT COMPLICATIONS: ICD-10-CM

## 2025-02-11 DIAGNOSIS — E78.5 TYPE 2 DIABETES MELLITUS WITH OTHER SPECIFIED COMPLICATION: ICD-10-CM

## 2025-02-11 LAB
GLUCOSE BLDC GLUCOMTR-MCNC: 101
HBA1C MFR BLD HPLC: 5.7

## 2025-02-11 PROCEDURE — 83036 HEMOGLOBIN GLYCOSYLATED A1C: CPT | Mod: QW

## 2025-02-11 PROCEDURE — 99214 OFFICE O/P EST MOD 30 MIN: CPT

## 2025-02-11 PROCEDURE — 82962 GLUCOSE BLOOD TEST: CPT

## 2025-02-11 PROCEDURE — 36415 COLL VENOUS BLD VENIPUNCTURE: CPT | Mod: 59

## 2025-02-11 PROCEDURE — G2211 COMPLEX E/M VISIT ADD ON: CPT | Mod: NC

## 2025-02-11 RX ORDER — DULAGLUTIDE 1.5 MG/.5ML
1.5 INJECTION, SOLUTION SUBCUTANEOUS
Qty: 3 | Refills: 2 | Status: ACTIVE | COMMUNITY
Start: 2025-02-11 | End: 1900-01-01

## 2025-02-12 LAB
25(OH)D3 SERPL-MCNC: 24.2 NG/ML
ALBUMIN SERPL ELPH-MCNC: 4.4 G/DL
ALP BLD-CCNC: 58 U/L
ALT SERPL-CCNC: 17 U/L
ANION GAP SERPL CALC-SCNC: 11 MMOL/L
AST SERPL-CCNC: 21 U/L
BILIRUB SERPL-MCNC: 0.5 MG/DL
BUN SERPL-MCNC: 10 MG/DL
CALCIUM SERPL-MCNC: 10.2 MG/DL
CALCIUM SERPL-MCNC: 10.2 MG/DL
CHLORIDE SERPL-SCNC: 101 MMOL/L
CHOLEST SERPL-MCNC: 235 MG/DL
CO2 SERPL-SCNC: 28 MMOL/L
CREAT SERPL-MCNC: 0.76 MG/DL
CREAT SPEC-SCNC: 190 MG/DL
EGFR: 88 ML/MIN/1.73M2
GLUCOSE SERPL-MCNC: 96 MG/DL
HDLC SERPL-MCNC: 76 MG/DL
LDLC SERPL CALC-MCNC: 131 MG/DL
MICROALBUMIN 24H UR DL<=1MG/L-MCNC: 5 MG/DL
MICROALBUMIN/CREAT 24H UR-RTO: 27 MG/G
NONHDLC SERPL-MCNC: 159 MG/DL
PARATHYROID HORMONE INTACT: 35 PG/ML
POTASSIUM SERPL-SCNC: 4.7 MMOL/L
PROT SERPL-MCNC: 8.1 G/DL
SODIUM SERPL-SCNC: 139 MMOL/L
TRIGL SERPL-MCNC: 156 MG/DL

## 2025-02-18 ENCOUNTER — RESULT REVIEW (OUTPATIENT)
Age: 65
End: 2025-02-18

## 2025-06-30 ENCOUNTER — NON-APPOINTMENT (OUTPATIENT)
Age: 65
End: 2025-06-30

## 2025-07-02 ENCOUNTER — APPOINTMENT (OUTPATIENT)
Dept: BREAST CENTER | Facility: CLINIC | Age: 65
End: 2025-07-02

## 2025-08-08 ENCOUNTER — NON-APPOINTMENT (OUTPATIENT)
Age: 65
End: 2025-08-08

## (undated) DEVICE — DRSG STERISTRIPS 0.25 X 4"

## (undated) DEVICE — VENODYNE/SCD SLEEVE CALF MEDIUM

## (undated) DEVICE — PROBE PRASS SLIM MONOPOLAR STIMULATOR

## (undated) DEVICE — SUT SILK 3-0 18" TIES

## (undated) DEVICE — BLADE SCALPEL SAFETY #15 WITH PLASTIC GREEN HANDLE

## (undated) DEVICE — SPONGE ENDO PEANUT 5MM

## (undated) DEVICE — STAPLER SKIN PROXIMATE

## (undated) DEVICE — GLV 7 PROTEXIS (WHITE)

## (undated) DEVICE — SUT SILK 2-0 12-18"

## (undated) DEVICE — NDL HYPO REGULAR BEVEL 25G X 1.5" (BLUE)

## (undated) DEVICE — SHEARS HARMONIC ACE 5MM X 23CM CURVED TIP

## (undated) DEVICE — DRAPE EENT SPLIT SHEET LARGE

## (undated) DEVICE — DRSG MASTISOL

## (undated) DEVICE — SUT PROLENE 4-0 18" P-3

## (undated) DEVICE — DRAPE MAGNETIC INSTRUMENT MEDIUM

## (undated) DEVICE — POSITIONER PATIENT SAFETY STRAP 3X60"

## (undated) DEVICE — SUT VICRYL 5-0 18" P-3 UNDYED

## (undated) DEVICE — DRAPE FLUID WARMER 44 X 66"

## (undated) DEVICE — SAFETY PIN 1.5" MED

## (undated) DEVICE — WARMING BLANKET LOWER ADULT

## (undated) DEVICE — DRSG TEGADERM 2.5X3"

## (undated) DEVICE — SUT SILK 2-0 30" PSL

## (undated) DEVICE — TUBING SUCTION 20FT

## (undated) DEVICE — DRSG TEGADERM 4X4.75"

## (undated) DEVICE — DRAPE TOWEL BLUE 17" X 24"

## (undated) DEVICE — SYR LUER LOK 3CC

## (undated) DEVICE — DRSG CURITY GAUZE SPONGE 4 X 4" 12-PLY